# Patient Record
Sex: MALE | Race: WHITE | NOT HISPANIC OR LATINO | Employment: FULL TIME | URBAN - METROPOLITAN AREA
[De-identification: names, ages, dates, MRNs, and addresses within clinical notes are randomized per-mention and may not be internally consistent; named-entity substitution may affect disease eponyms.]

---

## 2017-01-19 ENCOUNTER — OFFICE VISIT (OUTPATIENT)
Dept: OTHER | Facility: CLINIC | Age: 52
End: 2017-01-19
Attending: INTERNAL MEDICINE
Payer: COMMERCIAL

## 2017-01-19 VITALS
SYSTOLIC BLOOD PRESSURE: 132 MMHG | OXYGEN SATURATION: 97 % | WEIGHT: 215 LBS | DIASTOLIC BLOOD PRESSURE: 76 MMHG | BODY MASS INDEX: 34.72 KG/M2 | HEART RATE: 64 BPM

## 2017-01-19 DIAGNOSIS — E78.5 DYSLIPIDEMIA: ICD-10-CM

## 2017-01-19 DIAGNOSIS — F17.218 CIGARETTE NICOTINE DEPENDENCE WITH OTHER NICOTINE-INDUCED DISORDER: ICD-10-CM

## 2017-01-19 DIAGNOSIS — E66.01 MORBID OBESITY DUE TO EXCESS CALORIES (H): ICD-10-CM

## 2017-01-19 DIAGNOSIS — I25.10 CORONARY ARTERY DISEASE INVOLVING NATIVE CORONARY ARTERY OF NATIVE HEART WITHOUT ANGINA PECTORIS: ICD-10-CM

## 2017-01-19 DIAGNOSIS — I10 BENIGN ESSENTIAL HYPERTENSION: ICD-10-CM

## 2017-01-19 DIAGNOSIS — R10.13 EPIGASTRIC PAIN: Primary | ICD-10-CM

## 2017-01-19 PROCEDURE — 99407 BEHAV CHNG SMOKING > 10 MIN: CPT | Mod: ZP | Performed by: INTERNAL MEDICINE

## 2017-01-19 PROCEDURE — 99215 OFFICE O/P EST HI 40 MIN: CPT | Mod: ZP | Performed by: INTERNAL MEDICINE

## 2017-01-19 PROCEDURE — 99211 OFF/OP EST MAY X REQ PHY/QHP: CPT

## 2017-01-19 NOTE — NURSING NOTE
"Chief Complaint   Patient presents with     RECHECK     6 month follow up per        Initial /76 mmHg  Pulse 64  Wt 215 lb (97.523 kg)  SpO2 97% Estimated body mass index is 34.72 kg/(m^2) as calculated from the following:    Height as of 10/31/16: 5' 6\" (1.676 m).    Weight as of this encounter: 215 lb (97.523 kg).  BP completed using cuff size: large right arm.    Face to Face nursing time 7 minutes     Juli Vera CMA      "

## 2017-01-19 NOTE — MR AVS SNAPSHOT
After Visit Summary   1/19/2017    Mason Powers    MRN: [id number]           Patient Information     Date Of Birth          1965        Visit Information        Provider Department      1/19/2017 11:30 AM Niesha Lew MD [facility]      Today's Diagnoses     Epigastric pain    -  1     Coronary artery disease involving native coronary artery of native heart without angina pectoris s/p stent placement in Aug 2015.         Benign essential hypertension         Dyslipidemia         Morbid obesity due to excess calories (H)         Cigarette nicotine dependence with other nicotine-induced disorder           Care Instructions      HOW TO QUIT SMOKING  Smoking is one of the hardest habits to break. About half of all those who have ever smoked have been able to quit, and most of those (about 70%) who still smoke want to quit. Here are some of the best ways to stop smoking.     KEEP TRYING:  It takes most smokers about 8 tries before they are finally able to fully quit. So, the more often you try and fail, the better your chance of quitting the next time! So, don't give up!    GO COLD TURKEY:  Most ex-smokers quit cold turkey. Trying to cut back gradually doesn't seem to work as well, perhaps because it continues the smoking habit. Also, it is possible to fool yourself by inhaling more while smoking fewer cigarettes. This results in the same amount of nicotine in your body!    GET SUPPORT:  Support programs can make an important difference, especially for the heavy smoker. These groups offer lectures, methods to change your behavior and peer support. Call the free national Quitline for more information. 800-QUIT-NOW (540-753-3854). Low-cost or free programs are offered by many hospitals, local chapters of the American Lung Association (586-944-3227) and the American Cancer Society (368-615-7260). Support at home  is important too. Non-smokers can help by offering praise and encouragement. If the smoker fails to quit, encourage them to try again!    OVER-THE-COUNTER MEDICINES:  For those who can't quit on their own, Nicotine Replacement Therapy (NRT) may make quitting much easier. Certain aids such as the nicotine patch, gum and lozenge are available without a prescription. However, it is best to use these under the guidance of your doctor. The skin patch provides a steady supply of nicotine to the body. Nicotine gum and lozenge gives temporary bursts of low levels of nicotine. Both methods take the edge off the craving for cigarettes. WARNING: If you feel symptoms of nicotine overdose, such as nausea, vomiting, dizziness, weakness, or fast heartbeat, stop using these and see your doctor.    PRESCRIPTION MEDICINES:  After evaluating your smoking patterns and prior attempts at quitting, your doctor may offer a prescription medicine such as bupropion (Zyban, Wellbutrin), varenicline (Chantix, Champix), a niocotine inhaler or nasal spray. Each has its unique advantage and side effects which your doctor can review with you.    HEALTH BENEFITS OF QUITTING:  The benefits of quitting start right away and keep improving the longer you go without smokin minutes: blood pressure and pulse return to normal  8 hours: oxygen levels return to normal  2 days: ability to smell and taste begins to improve as damaged nerves start to regrow  2-3 weeks: circulation and lung function improves  1-9 months: decreased cough, congestion and shortness of breath; less tired  1 year: risk of heart attack decreases by half  5 years: risk of lung cancer decreases by half; risk of stroke becomes the same as a non-smoker  For information about how to quit smoking, visit the following links:  National [employer] [place] ,   Clearing the Air, Quit Smoking Today   - an online booklet. http://www.smokefree.gov/pubs/clearing_the_air.pdf  Smokefree.gov  http://smokefree.gov/  QuitNet http://www.quitnet.com/    6748-8851 [employer], [address] [facility], [place] [address]. All rights reserved. This information is not intended as a substitute for professional medical care. Always follow your healthcare professional's instructions.    The Benefits of Living Smoke Free  What do you want to gain from quitting? Check off some reasons to quit.  Health Benefits  ___ Reduce my risk of lung cancer, heart disease, chronic lung disease  ___ Have fewer wrinkles and softer skin  ___ Improve my sense of taste and smell  ___ For pregnant women--reduce the risk of having a miscarriage, stillbirth, premature birth, or low-birth-weight baby  Personal Benefits  ___ Feel more in control of my life  ___ Have better-smelling hair, breath, clothes, home, and car  ___ Save time by not having to take smoke breaks, buy cigarettes, or hunt for a light  ___ Have whiter teeth  Family Benefits  ___ Reduce my children s respiratory tract infections  ___ Set a good example for my children  ___ Reduce my family s cancer risk  Financial Benefits  ___ Save hundreds of dollars each year that would be spent on cigarettes  ___ Save money on medical bills  ___ Save on life, health, and car insurance premiums    Those Dollars Add Up!  Cigarettes are expensive, and getting more expensive all the time. Do you realize how much money you are spending on cigarettes per year? What is the average amount you spend on a pack of cigarettes? What is the average number of packs that you smoke per day? Using your answers to these questions, fill in this formula to help you find out:  ($ _____ per pack) ×  ( _____ number of packs per day) × (365 days) =  $ _____ yearly cost of smoking  Besides tobacco, there are other costs, including extra cleaning bills and replacement costs for clothing and furniture; medical expenses for smoking-related illnesses; and higher health, life, and car insurance  premiums.    Cigars and Pipes Count Too!  Cigars and pipes are also dangerous. So are smokeless (chewing) tobacco and snuff. All of these products contain nicotine, a highly addictive substance that has harmful effects on your body. Quitting smoking means giving up all tobacco products.      3514-4032 [facility], [address] [place] [address]. All rights reserved. This information is not intended as a substitute for professional medical care. Always follow your healthcare professional's instructions.        Follow-ups after your visit        Additional Services     Follow-Up with Vascular Medicine       These are internal orders to be used by  [facility] providers only.                  Follow-up notes from your care team     Return in about 6 months (around 7/19/2017) for Routine Visit.      Future tests that were ordered for you today     Open Future Orders        Priority Expected Expires Ordered    Follow-Up with Vascular Medicine Routine 7/19/2017 1/19/2018 1/19/2017            Who to contact     If you have questions or need follow up information about today's clinic visit or your schedule please contact [facility] directly at [phone].  Normal or non-critical lab and imaging results will be communicated to you by [employer], letter or phone within 4 business days after the clinic has received the results. If you do not hear from us within 7 days, please contact the clinic through [email or web address] or phone. If you have a critical or abnormal lab result, we will notify you by phone as soon as possible.  Submit refill requests through [employer] or call your pharmacy and they will forward the refill request to us. Please allow 3 business days for your refill to be completed.          Additional Information About Your Visit        Owned it Information     [employer] it lets you send messages to your doctor, view your test results, renew your prescriptions, schedule  "appointments and more. To sign up, go to [email or web address] . Click on \"Log in\" on the left side of the screen, which will take you to the Welcome page. Then click on \"Sign up Now\" on the right side of the page.     You will be asked to enter the access code listed below, as well as some personal information. Please follow the directions to create your username and password.     Your access code is: [email or web address]  Expires: 2017 11:58 AM     Your access code will  in 90 days. If you need help or a new code, please call your [facility] clinic or [phone].        Care EveryWhere ID     This is your Care EveryWhere ID. This could be used by other organizations to access your [facility] medical records  [id number]        Your Vitals Were     Pulse Pulse Oximetry                64 97%           Blood Pressure from Last 3 Encounters:   17 132/76   10/31/16 112/80   16 125/90    Weight from Last 3 Encounters:   17 215 lb (97.523 kg)   10/31/16 214 lb (97.07 kg)   16 216 lb (97.977 kg)              We Performed the Following     Follow-Up with Vascular Medicine     SMOKING CESSATION COUNSELING >10 MIN     Tobacco Cessation - for Health Maintenance          Today's Medication Changes          These changes are accurate as of: 17 11:58 AM.  If you have any questions, ask your nurse or doctor.               These medicines have changed or have updated prescriptions.        Dose/Directions    ranitidine 150 MG tablet   Commonly known as:  ZANTAC   This may have changed:    - when to take this  - reasons to take this   Used for:  Epigastric discomfort, Gastroesophageal reflux disease without esophagitis        Dose:  150 mg   Take 1 tablet (150 mg) by mouth 2 times daily   Quantity:  60 tablet   Refills:  2                Primary Care Provider Office Phone # Fax #    Stephanie Hess [phone]       [place] [phone]      "   Thank you!     Thank you for choosing [facility] [place]  for your care. Our goal is always to provide you with excellent care. Hearing back from our patients is one way we can continue to improve our services. Please take a few minutes to complete the written survey that you may receive in the mail after your visit with us. Thank you!             Your Updated Medication List - Protect others around you: Learn how to safely use, store and throw away your medicines at www.disposemymeds.org.          This list is accurate as of: 1/19/17 11:58 AM.  Always use your most recent med list.                   Brand Name Dispense Instructions for use    aspirin 81 MG EC tablet      Take 1 tablet (81 mg) by mouth daily       atorvastatin 80 MG tablet    LIPITOR    90 tablet    Take 1 tablet (80 mg) by mouth daily       metoprolol 25 MG tablet    LOPRESSOR    180 tablet    Take 1 tablet (25 mg) by mouth 2 times daily       nitroglycerin 0.4 MG sublingual tablet    NITROSTAT    25 tablet    Place 1 tablet (0.4 mg) under the tongue every 5 minutes as needed for chest pain       ranitidine 150 MG tablet    ZANTAC    60 tablet    Take 1 tablet (150 mg) by mouth 2 times daily

## 2017-01-19 NOTE — PROGRESS NOTES
HPI: Mason Powers is a 51 year old year old patient who receives primary care from Stephanie Hess   Here today for the follow-up visit.        He was initially admitted to the [facility] with abdominal pain and abnormal proximal SMA with inflammation, extensive workup negative and these symptoms resolved.  CT angiogram did not reveal any dissection.  Optimized  His risk factors and repeat CTA in December 2015 was unremarkable. He was encouraged and educated to quit smoking completely, he quit smoking approximately 12 weeks and smokes 5 cigs a day now..He initially lost 15 pounds then gained back weight..      He also developed anginal symptoms, NSTEMI  , re-admitted , cor angio and RUTH ANN placed. He completed one year duration of plavix and asa , currently ASA only.         PAST MEDICAL HISTORY  Past Medical History   Diagnosis Date     Obesity      Abdominal pain 6/2015     with proximal SMA inflammation vasculatis w/u negative.no discetion     HTN (hypertension)      Nicotine dependence      Dyslipidaemia      Lung nodule      very small 0.4 mm incidental finding on CT.     Uncomplicated asthma      CAD (coronary artery disease) 8/2015     RUTH ANN-OM, mod RCA and mod/sev small PDA, mild-mod diffuse  Lad, Diag small with mod/sev     NSTEMI (non-ST elevated myocardial infarction) (H) 2015     GERD (gastroesophageal reflux disease)        CURRENT MEDICATIONS  Current Outpatient Prescriptions   Medication Sig Dispense Refill     metoprolol (LOPRESSOR) 25 MG tablet Take 1 tablet (25 mg) by mouth 2 times daily 180 tablet 3     atorvastatin (LIPITOR) 80 MG tablet Take 1 tablet (80 mg) by mouth daily 90 tablet 3     ranitidine (ZANTAC) 150 MG tablet Take 1 tablet (150 mg) by mouth 2 times daily (Patient taking differently: Take 150 mg by mouth as needed ) 60 tablet 2     nitroglycerin (NITROSTAT) 0.4 MG SL tablet Place 1 tablet (0.4 mg) under the tongue every 5 minutes as needed for chest pain 25 tablet 3      aspirin EC 81 MG EC tablet Take 1 tablet (81 mg) by mouth daily         PAST SURGICAL HISTORY:  Past Surgical History   Procedure Laterality Date     Stent, coronary, s660 15/18  2015      RUTH ANN-OM2( 50%RCA, 50-70 PDA,50% LAD, diag small >50%, RUTH ANN to OM       ALLERGIES     Allergies   Allergen Reactions     Shrimp Difficulty breathing     Throat starts to swell        FAMILY HISTORY  Family History   Problem Relation Age of Onset     DIABETES Father      DIABETES Brother        VASCULAR FAMILY HISTORY  1st order relative with atherosclerotic PAD: No  1st order relative with AAA: No    SOCIAL HISTORY  Social History     Social History     Marital Status:      Spouse Name: Aurea     Number of Children: 2     Years of Education: N/A     Occupational History                Social History Main Topics     Smoking status: Current Every Day Smoker     Smokeless tobacco: Former User     Quit date: 07/29/2015      Comment: 3-5 cigarettes daily, smoking for the last 30 plus years. quit but restarted 1 pk/week     Alcohol Use: 0.0 oz/week     0 Standard drinks or equivalent per week      Comment: socially     Drug Use: No      Comment: recently quit      Sexual Activity:     Partners: Female     Other Topics Concern     Caffeine Concern Yes     1 pot a coffee occas days      Special Diet Yes     more wheat/grains, less sugar, leaner meats     Exercise Yes     get some walking during the week      Social History Narrative       ROS:   Constitutional: No fever, chills, or sweats. No weight gain/loss   ENT: No visual disturbance, ear ache, epistaxis, sore throat  Allergies/Immunologic: Negative  Respiratory: No cough, hemoptysia  Cardiovascular: As per HPI  GI: No nausea, vomiting, hematemesis, melena, or hematochezia  : No urinary frequency, dysuria, or hematuria  Integument: Negative  Psychiatric: Negative  Neuro: Negative  Endocrinology: Negative   Musculoskeletal: Negative  Vascular: No walking impairment,  claudication, ischemic rest pain or nonhealing wounds    EXAM:  /76 mmHg  Pulse 64  Wt 215 lb (97.523 kg)  SpO2 97%  In general, the patient is a pleasant male in no apparent distress.    HEENT: NC/AT.  PERRLA.  EOMI.  Sclerae white, not injected.  Nares clear.  Pharynx without erythema or exudate.  Dentition intact.    Neck: No adenopathy.  No thyromegaly. Carotids +2/2 bilaterally without bruits.  No jugular venous distension.   Heart: RRR. Normal S1, S2 splits physiologically. No murmur, rub, click, or gallop. The PMI is in the 5th ICS in the midclavicular line. There is no heave.    Lungs: CTA.  No ronchi, wheezes, rales.  No dullness to percussion.   Abdomen: Soft, nontender, nondistended. No organomegaly. No AAA.  No bruits.   Extremities: No clubbing, cyanosis, or edema.  No wounds. No varicose veins signs of chronic venous insufficiency.   Vascular: No bruits are noted.   Brachial Radial Ulnar Femoral Popliteal DP PT   Left 2/2 2/2 2/2 2/2 2/2 2/2 2/2   Right 2/2 2/2 2/2 2/2 2/2 2/2 2/2     Labs:  LIPID RESULTS:  Lab Results   Component Value Date    CHOL 95 10/31/2016    HDL 33* 10/31/2016    LDL 34 10/31/2016    TRIG 140 10/31/2016    CHOLHDLRATIO 2.8 09/18/2015       LIVER ENZYME RESULTS:  Lab Results   Component Value Date    AST 20 06/13/2016    ALT 51 10/31/2016       CBC RESULTS:  Lab Results   Component Value Date    WBC 11.7* 06/13/2016    RBC 4.90 06/13/2016    HGB 15.5 06/13/2016    HCT 45.8 06/13/2016    MCV 94 06/13/2016    MCH 31.6 06/13/2016    MCHC 33.8 06/13/2016    RDW 12.3 06/13/2016     06/13/2016       BMP RESULTS:  Lab Results   Component Value Date     06/13/2016    POTASSIUM 4.6 06/13/2016    CHLORIDE 108 06/13/2016    CO2 27 06/13/2016    ANIONGAP 8 06/13/2016    * 06/13/2016    BUN 13 06/13/2016    CR 0.85 06/13/2016    GFRESTIMATED >90  Non  GFR Calc   06/13/2016    GFRESTBLACK >90   GFR Calc   06/13/2016    DIEGO 8.5  2016          Procedures:    Name: ABIGAIL SHRESTHA  MRN: [id number]  : 1965  Study Date: 2015 10:46 AM  Age: 50 yrs  Gender: Male  Patient Location: [facility]  Reason For Study: Chest Pain  Ordering Physician: VINH EPPERSON  Referring Physician: MASSIEL VAUGHAN  Performed By: Zechariah Worley RDCS    BSA: 2.1 m2  Height: 67 in  Weight: 220 lb  HR: 65  BP: 116/68 mmHg  ______________________________________________________________________________      Procedure  Complete Portable Echo Adult.  ______________________________________________________________________________    Interpretation Summary    The left ventricle is normal in size.  There is moderate concentric left ventricular hypertrophy.  Left ventricular systolic function is normal.  The visual ejection fraction is estimated at 55%.  The mitral valve is normal in structure and function.  Normal tricuspid aortic valve  The aortic root is normal size.  There is no pericardial effusion.  The rhythm was normal sinus.    CONCLUSION: left Heart Cath 8/3/15 at [facility]  1. Successful angioplasty and stenting of a 90% stenosis in the second  obtuse marginal placing a 2.75 x 38 mm Promus premier drug-eluting  stent leaving a 0% residual stenosis with FERMÍN grade 3 flow. Note,  following balloon inflation there was a intimal dissection with  transient slow flow down the vessel successfully treated by a  combination of stenting, adenosine, and nitroglycerin.  2. 50% proximal RCA stenosis with a 50-70% stenosis and a small to  moderate-sized posterior descending artery.  3. 50% mid LAD stenosis involving the takeoff of the second diagonal.  There is also a 40-50% narrowing in the distal left anterior  descending artery.  4. Normal left ventricular function estimated ejection fraction of  60%. Left ventricular end-diastolic pressure of 22 mmHg.    RECOMMENDATIONS:   1. Aspirin 81 mg indefinitely.  2. Plavix 75 mg daily for one year.  3.  Discontinue cigarette smoking.  4. Beta blockers and ACE inhibitors as tolerated.  5. Statin therapy aiming for an LDL of less than 70.  6 Mediterranean-style diet.  7. Daily exercise.    MARLENY TOLEDO MD          CTA ANGIOGRAM ABDOMEN AND PELVIS  12/22/2015 4:47 PM       HISTORY:  Epigastric pain, evaluate superior mesenteric artery.     TECHNIQUE: IV contrast CT angiography is performed through the abdomen  and pelvis utilizing 80 mL of Isovue-370. 3-D reconstructions were  performed at an independent workstation.     COMPARISON 6/17/2015.     FINDINGS: There has been near complete interval resolution of the  previous inflammatory soft tissue changes about the origin and  proximal superior mesenteric artery when compared to the previous  study. Mild residual soft tissue thickening is seen along the inferior  margin of the vessel; however, on high-resolution 3-D images there is  no evidence of inflammatory change or narrowing of the vessel. The  celiac trunk, inferior mesenteric artery, and the right and left renal  arteries appear normal. The abdominal aorta appears normal. There is  mild calcified plaque involving the common iliac arteries bilaterally  without significant stenosis. The iliac, internal iliac, common  femoral, and visualized proximal superficial femoral and profunda  femoris arteries appear normal. The previously demonstrated fatty  infiltration of the liver is not appreciated on today's study. The  gallbladder appears normal with no evidence of vicarious excretion of  contrast as noted on the prior study. Remainder of the abdomen and  pelvis is unremarkable.     IMPRESSION  1. There has been near complete interval resolution of the previous  perivascular inflammatory changes about the origin and proximal  superior mesenteric artery when compared to 6/17/2015. Mild residual  soft tissue thickening along the inferior margin of the vessel without  evidence of inflammatory change or vessel  narrowing.  2. The celiac trunk, the inferior mesenteric artery, the renal  arteries, and the abdominal aorta appear normal.  3. Mild atherosclerotic plaque in the common iliac arteries without  stenosis. Remaining iliac and visualized femoral vessels are normal.  4. Interval resolution of previous fatty infiltration of the liver.     CATIA JOSE MD    CT ABDOMEN WITH CONTRAST 6/3/2016 9:17 PM       HISTORY: Epigastric discomfort, belching, bloating.  Previous history  of SMA inflammation.     COMPARISON: CT abdomen and pelvis 12/22/2015.     TECHNIQUE: Axial images from the lung bases to the iliac crests are  performed with additional coronal reformatted images. 102 mL of Isovue  370 are given intravenously.  Radiation dose for this scan was reduced  using automated exposure control, adjustment of the mA and/or kV  according to patient size, or iterative reconstruction technique. Oral  contrast is also given.     FINDINGS:       The lung bases are clear.     Abdomen: The upper abdominal organs are within normal limits including  the liver, spleen, gallbladder, pancreas, adrenal glands and kidneys.    No hydronephrosis.  Aorta demonstrates a few scattered calcified  plaques.  No evidence of aneurysm or dissection.  Celiac axis and SMA  appear normal.  Imaged portions of bowel are unremarkable.  Appendix  is normal.  Stomach is nondistended.  No evidence of abdominal wall  hernia.  Bone window examination is unremarkable.                                                                       IMPRESSION: No acute changes in the abdomen or lung bases where  imaged.  Celiac axis and SMA appear normal.     CHASTITY BRAVO MD           Assessment and Plan:       H/O Abdominal pain with abnormal proximal SMA with inflammation and no disection ?? Vasculitis per CT but work up negative 6/2015:    He was recently admitted to the [facility] with acute abdominal pain unclear etiology and suspicious for SMA proximal vasculitis but  extensive workup was negative except elevated CRP.  His symptoms completely resolved and he took few day course of Medrol Dosepak for his chronic back pain. He is able to do routine activities without any problems and no recurrence of abdominal pain. Vasculitis panel is negative, rheumatoid factor and janie negative.  His exam is completely benign. Improved hypertension  and currently tolerating statin and aspirin without any problems.  Repeat CTA in 2015 December negative   Recent CT abdomen and pelvis with contrast negative in first week of June 2016    Plan:  Continue aspirin,  Continue statin  Continue ACE I, BB etc   quit smoking        CAD (coronary artery disease) admitted with NSTEMI 8/2/15 s/p RUTH ANN in 2nd OM for 90 % stenosis with good success.    [facility] upper back pain radiating towards bilateral arms.  His troponins were elevated and underwent coronary angiogram and noted severe stenosis of second OM status post drug-eluting stent.  Reviewed recent hospitalization in the [facility].  He is seeing  cardiologist  Dr. Jeff at [facility]   Aspirin for lifetime   completed Plavix , took  one year  Aggressive risk factor modifications with statin, complete cessation of smoking, beta blocker and ACE inhibitor.      Dyslipidaemia:    This taking atorvastatin 80 mg daily, excellent lipids.  Therapeutic lifestyle modification suggested  Weight loss suggested.       Hypertension:    Well controlled with low-dose lisinopril, metoprolol.  DASH Diet suggested  Lose weight and monitor blood pressure outside    Asymptomatic bradycardia while on BB:    Watch for now.      Morbid obesity:    He lost 15 pounds and gained back.   Educated and suggested to see weight loss clinic.  Decrease calorie intake and exercise as tolerated.       Nicotine dependence  Has been smoking since the seventh grade and completely quit for 12  weeks after first Hospitalization.  Restarted and smokes 5 cigs a  day.  Offered help he wanted to quit on his own.  Time spent counseling>10 mins.     Esophageal reflux    Currently on ranitidine and following anti reflux measures etc.  Avoid NSAIDS  Lose weight      Return to clinic in six months or sooner if any problems  CC: Primary care physician    Time spent today 50 minutes, more than 50% of the time spent counseling and coordination of the care and face-to-face contact. REVIEWED ALL THE IMAGING STUDIES WITH THE PATIENT.    This note was dictated by utilizing Dragon software.  CC: Primary care physician

## 2017-01-19 NOTE — PATIENT INSTRUCTIONS

## 2017-03-14 ENCOUNTER — OFFICE VISIT - HEALTHEAST (OUTPATIENT)
Dept: FAMILY MEDICINE | Facility: CLINIC | Age: 52
End: 2017-03-14

## 2017-03-14 DIAGNOSIS — I25.10 ASCVD (ARTERIOSCLEROTIC CARDIOVASCULAR DISEASE): ICD-10-CM

## 2017-03-14 DIAGNOSIS — L21.9 SEBORRHEIC DERMATITIS: ICD-10-CM

## 2017-03-14 DIAGNOSIS — E55.9 VITAMIN D DEFICIENCY: ICD-10-CM

## 2017-03-14 DIAGNOSIS — R73.9 ELEVATED BLOOD SUGAR: ICD-10-CM

## 2017-03-14 LAB
CHOLEST SERPL-MCNC: 129 MG/DL
FASTING STATUS PATIENT QL REPORTED: NO
HBA1C MFR BLD: 5.7 % (ref 3.5–6)
HDLC SERPL-MCNC: 32 MG/DL
LDLC SERPL CALC-MCNC: 66 MG/DL

## 2017-03-14 ASSESSMENT — MIFFLIN-ST. JEOR: SCORE: 1783.4

## 2017-03-23 ENCOUNTER — COMMUNICATION - HEALTHEAST (OUTPATIENT)
Dept: FAMILY MEDICINE | Facility: CLINIC | Age: 52
End: 2017-03-23

## 2017-03-23 ENCOUNTER — AMBULATORY - HEALTHEAST (OUTPATIENT)
Dept: FAMILY MEDICINE | Facility: CLINIC | Age: 52
End: 2017-03-23

## 2017-03-23 DIAGNOSIS — E87.5 HYPERKALEMIA: ICD-10-CM

## 2017-03-23 DIAGNOSIS — R74.8 ELEVATED LIVER ENZYMES: ICD-10-CM

## 2017-03-24 ENCOUNTER — COMMUNICATION - HEALTHEAST (OUTPATIENT)
Dept: FAMILY MEDICINE | Facility: CLINIC | Age: 52
End: 2017-03-24

## 2017-05-20 ENCOUNTER — RECORDS - HEALTHEAST (OUTPATIENT)
Dept: ADMINISTRATIVE | Facility: OTHER | Age: 52
End: 2017-05-20

## 2017-05-25 DIAGNOSIS — I21.4 NON-STEMI (NON-ST ELEVATED MYOCARDIAL INFARCTION) (H): ICD-10-CM

## 2017-05-25 RX ORDER — NITROGLYCERIN 0.4 MG/1
0.4 TABLET SUBLINGUAL EVERY 5 MIN PRN
Qty: 25 TABLET | Refills: 3 | Status: SHIPPED | OUTPATIENT
Start: 2017-05-25 | End: 2018-07-17

## 2017-05-25 NOTE — TELEPHONE ENCOUNTER
Received refill request for:  Ntg  Last OV was: 10/31/2016  Labs/EKG: n/a  F/U scheduled: 10/2017  New script sent to: [facility]

## 2017-08-29 ENCOUNTER — RECORDS - HEALTHEAST (OUTPATIENT)
Dept: ADMINISTRATIVE | Facility: OTHER | Age: 52
End: 2017-08-29

## 2017-08-29 ENCOUNTER — OFFICE VISIT (OUTPATIENT)
Dept: OTHER | Facility: CLINIC | Age: 52
End: 2017-08-29
Attending: INTERNAL MEDICINE
Payer: COMMERCIAL

## 2017-08-29 VITALS
OXYGEN SATURATION: 96 % | HEART RATE: 53 BPM | HEIGHT: 66 IN | DIASTOLIC BLOOD PRESSURE: 85 MMHG | WEIGHT: 221 LBS | BODY MASS INDEX: 35.52 KG/M2 | SYSTOLIC BLOOD PRESSURE: 139 MMHG

## 2017-08-29 DIAGNOSIS — I10 BENIGN ESSENTIAL HYPERTENSION: ICD-10-CM

## 2017-08-29 DIAGNOSIS — F17.218 CIGARETTE NICOTINE DEPENDENCE WITH OTHER NICOTINE-INDUCED DISORDER: ICD-10-CM

## 2017-08-29 DIAGNOSIS — I25.10 CORONARY ARTERY DISEASE INVOLVING NATIVE CORONARY ARTERY OF NATIVE HEART WITHOUT ANGINA PECTORIS: ICD-10-CM

## 2017-08-29 DIAGNOSIS — R10.13 EPIGASTRIC PAIN: Primary | ICD-10-CM

## 2017-08-29 DIAGNOSIS — E66.01 MORBID OBESITY, UNSPECIFIED OBESITY TYPE (H): ICD-10-CM

## 2017-08-29 DIAGNOSIS — E78.2 MIXED HYPERLIPIDEMIA: ICD-10-CM

## 2017-08-29 PROCEDURE — 99214 OFFICE O/P EST MOD 30 MIN: CPT | Mod: ZP | Performed by: INTERNAL MEDICINE

## 2017-08-29 PROCEDURE — 99407 BEHAV CHNG SMOKING > 10 MIN: CPT | Mod: ZP | Performed by: INTERNAL MEDICINE

## 2017-08-29 PROCEDURE — 99211 OFF/OP EST MAY X REQ PHY/QHP: CPT

## 2017-08-29 RX ORDER — METOPROLOL TARTRATE 25 MG/1
25 TABLET, FILM COATED ORAL 2 TIMES DAILY
Qty: 180 TABLET | Refills: 3 | Status: SHIPPED | OUTPATIENT
Start: 2017-08-29 | End: 2018-09-22

## 2017-08-29 RX ORDER — ATORVASTATIN CALCIUM 80 MG/1
80 TABLET, FILM COATED ORAL DAILY
Qty: 90 TABLET | Refills: 3 | Status: SHIPPED | OUTPATIENT
Start: 2017-08-29 | End: 2018-11-27

## 2017-08-29 NOTE — NURSING NOTE
"Chief Complaint   Patient presents with     RECHECK     F/U appt       Initial /85 (BP Location: Right arm, Patient Position: Chair, Cuff Size: Adult Large)  Pulse 53  Ht 5' 6\" (1.676 m)  Wt 221 lb (100.2 kg)  SpO2 96%  BMI 35.67 kg/m2 Estimated body mass index is 35.67 kg/(m^2) as calculated from the following:    Height as of this encounter: 5' 6\" (1.676 m).    Weight as of this encounter: 221 lb (100.2 kg).  Medication Reconciliation: complete    Face to face time: 5 minutes    Magui Fonseca CMA    "

## 2017-08-29 NOTE — PROGRESS NOTES
HPI: Mason Powers is a 51 year old year old patient who receives primary care from Stephanie Hess   Here today for the follow-up visit.      He was initially admitted 2  Years ago to the [facility] with abdominal pain and abnormal proximal SMA with inflammation, extensive workup negative and these symptoms resolved.  CT angiogram did not reveal any dissection.  Optimized  his risk factors and repeat CTA in December 2015 was unremarkable. He was encouraged and educated to quit smoking completely, he quit smoking approximately 12 weeks and smokes 4- 5 cigs a day now.  He lost few pounds since last visit.  He also developed anginal symptoms, NSTEMI  , re-admitted , cor angio and RUTH ANN placed. He completed one year duration of plavix and asa , currently ASA only.     No abdominal pain and no chest pain. He is back to work and feeling better. Few months ago FLP good range.    PAST MEDICAL HISTORY  Past Medical History:   Diagnosis Date     Abdominal pain 6/2015    with proximal SMA inflammation vasculatis w/u negative.no discetion     CAD (coronary artery disease) 8/2015    RUTH ANN-OM, mod RCA and mod/sev small PDA, mild-mod diffuse  Lad, Diag small with mod/sev     Dyslipidaemia      GERD (gastroesophageal reflux disease)      HTN (hypertension)      Lung nodule     very small 0.4 mm incidental finding on CT.     Nicotine dependence      NSTEMI (non-ST elevated myocardial infarction) (H) 2015     Obesity      Uncomplicated asthma        CURRENT MEDICATIONS  Current Outpatient Prescriptions   Medication Sig Dispense Refill     metoprolol (LOPRESSOR) 25 MG tablet Take 1 tablet (25 mg) by mouth 2 times daily 180 tablet 3     atorvastatin (LIPITOR) 80 MG tablet Take 1 tablet (80 mg) by mouth daily 90 tablet 3     nitroglycerin (NITROSTAT) 0.4 MG sublingual tablet Place 1 tablet (0.4 mg) under the tongue every 5 minutes as needed for chest pain 25 tablet 3     ranitidine (ZANTAC) 150 MG tablet Take 1 tablet (150 mg)  by mouth 2 times daily (Patient taking differently: Take 150 mg by mouth as needed ) 60 tablet 2     aspirin EC 81 MG EC tablet Take 1 tablet (81 mg) by mouth daily       [DISCONTINUED] metoprolol (LOPRESSOR) 25 MG tablet Take 1 tablet (25 mg) by mouth 2 times daily 180 tablet 3     [DISCONTINUED] atorvastatin (LIPITOR) 80 MG tablet Take 1 tablet (80 mg) by mouth daily 90 tablet 3       PAST SURGICAL HISTORY:  Past Surgical History:   Procedure Laterality Date     STENT, CORONARY, S660 15/18  2015     RUTH ANN-OM2( 50%RCA, 50-70 PDA,50% LAD, diag small >50%, RUTH ANN to OM       ALLERGIES     Allergies   Allergen Reactions     Shrimp Difficulty breathing     Throat starts to swell        FAMILY HISTORY  Family History   Problem Relation Age of Onset     DIABETES Father      DIABETES Brother        VASCULAR FAMILY HISTORY  1st order relative with atherosclerotic PAD: No  1st order relative with AAA: No    SOCIAL HISTORY  Social History     Social History     Marital status:      Spouse name: Aurea     Number of children: 2     Years of education: N/A     Occupational History                Social History Main Topics     Smoking status: Current Every Day Smoker     Types: Cigarettes     Smokeless tobacco: Former User     Quit date: 7/29/2015      Comment: 3-4 cigarettes daily, smoking for the last 30 plus years. quit but restarted 1 pk/week     Alcohol use 0.0 oz/week     0 Standard drinks or equivalent per week      Comment: socially     Drug use: No      Comment: recently quit      Sexual activity: Yes     Partners: Female     Other Topics Concern     Caffeine Concern Yes     1 pot a coffee occas days      Special Diet Yes     more wheat/grains, less sugar, leaner meats     Exercise Yes     get some walking during the week      Social History Narrative       ROS:   Constitutional: No fever, chills, or sweats. No weight gain/loss   ENT: No visual disturbance, ear ache, epistaxis, sore  "throat  Allergies/Immunologic: Negative  Respiratory: No cough, hemoptysia  Cardiovascular: As per HPI  GI: No nausea, vomiting, hematemesis, melena, or hematochezia  : No urinary frequency, dysuria, or hematuria  Integument: Negative  Psychiatric: Negative  Neuro: Negative  Endocrinology: Negative   Musculoskeletal: Negative  Vascular: No walking impairment, claudication, ischemic rest pain or nonhealing wounds    EXAM:  /85 (BP Location: Right arm, Patient Position: Chair, Cuff Size: Adult Large)  Pulse 53  Ht 5' 6\" (1.676 m)  Wt 221 lb (100.2 kg)  SpO2 96%  BMI 35.67 kg/m2  In general, the patient is a pleasant male in no apparent distress.    HEENT: NC/AT.  PERRLA.  EOMI.  Sclerae white, not injected.  Nares clear.  Pharynx without erythema or exudate.  Dentition intact.    Neck: No adenopathy.  No thyromegaly. Carotids +2/2 bilaterally without bruits.  No jugular venous distension.   Heart: RRR. Normal S1, S2 splits physiologically. No murmur, rub, click, or gallop. The PMI is in the 5th ICS in the midclavicular line. There is no heave.    Lungs: CTA.  No ronchi, wheezes, rales.  No dullness to percussion.   Abdomen: Soft, nontender, nondistended. No organomegaly. No AAA.  No bruits.   Extremities: No clubbing, cyanosis, or edema.  No wounds. No varicose veins signs of chronic venous insufficiency.   Vascular: No bruits are noted.   Brachial Radial Ulnar Femoral Popliteal DP PT   Left 2/2 2/2 2/2 2/2 2/2 2/2 2/2   Right 2/2 2/2 2/2 2/2 2/2 2/2 2/2     Labs:  LIPID RESULTS:  Lab Results   Component Value Date    CHOL 95 10/31/2016    HDL 33 (L) 10/31/2016    LDL 34 10/31/2016    TRIG 140 10/31/2016    CHOLHDLRATIO 2.8 09/18/2015       LIVER ENZYME RESULTS:  Lab Results   Component Value Date    AST 20 06/13/2016    ALT 51 10/31/2016       CBC RESULTS:  Lab Results   Component Value Date    WBC 11.7 (H) 06/13/2016    RBC 4.90 06/13/2016    HGB 15.5 06/13/2016    HCT 45.8 06/13/2016    MCV 94 " 2016    MCH 31.6 2016    MCHC 33.8 2016    RDW 12.3 2016     2016       BMP RESULTS:  Lab Results   Component Value Date     2016    POTASSIUM 4.6 2016    CHLORIDE 108 2016    CO2 27 2016    ANIONGAP 8 2016     (H) 2016    BUN 13 2016    CR 0.85 2016    GFRESTIMATED >90  Non  GFR Calc   2016    GFRESTBLACK >90   GFR Calc   2016    DIEGO 8.5 2016          Procedures:    Name: ABIGAIL SHRESTHA  MRN: [id number]  : 1965  Study Date: 2015 10:46 AM  Age: 50 yrs  Gender: Male  Patient Location: [facility]  Reason For Study: Chest Pain  Ordering Physician: VINH EPPERSON  Referring Physician: MASSIEL VAUGHAN  Performed By: Zechariah Worley RDCS    BSA: 2.1 m2  Height: 67 in  Weight: 220 lb  HR: 65  BP: 116/68 mmHg  ______________________________________________________________________________      Procedure  Complete Portable Echo Adult.  ______________________________________________________________________________    Interpretation Summary    The left ventricle is normal in size.  There is moderate concentric left ventricular hypertrophy.  Left ventricular systolic function is normal.  The visual ejection fraction is estimated at 55%.  The mitral valve is normal in structure and function.  Normal tricuspid aortic valve  The aortic root is normal size.  There is no pericardial effusion.  The rhythm was normal sinus.    CONCLUSION: left Heart Cath 8/3/15 at [facility]  1. Successful angioplasty and stenting of a 90% stenosis in the second  obtuse marginal placing a 2.75 x 38 mm Promus premier drug-eluting  stent leaving a 0% residual stenosis with FERMÍN grade 3 flow. Note,  following balloon inflation there was a intimal dissection with  transient slow flow down the vessel successfully treated by a  combination of stenting, adenosine, and nitroglycerin.  2. 50%  proximal RCA stenosis with a 50-70% stenosis and a small to  moderate-sized posterior descending artery.  3. 50% mid LAD stenosis involving the takeoff of the second diagonal.  There is also a 40-50% narrowing in the distal left anterior  descending artery.  4. Normal left ventricular function estimated ejection fraction of  60%. Left ventricular end-diastolic pressure of 22 mmHg.    RECOMMENDATIONS:   1. Aspirin 81 mg indefinitely.  2. Plavix 75 mg daily for one year.  3. Discontinue cigarette smoking.  4. Beta blockers and ACE inhibitors as tolerated.  5. Statin therapy aiming for an LDL of less than 70.  6 Mediterranean-style diet.  7. Daily exercise.    MARLENY TOLEDO MD          CTA ANGIOGRAM ABDOMEN AND PELVIS  12/22/2015 4:47 PM       HISTORY:  Epigastric pain, evaluate superior mesenteric artery.     TECHNIQUE: IV contrast CT angiography is performed through the abdomen  and pelvis utilizing 80 mL of Isovue-370. 3-D reconstructions were  performed at an independent workstation.     COMPARISON 6/17/2015.     FINDINGS: There has been near complete interval resolution of the  previous inflammatory soft tissue changes about the origin and  proximal superior mesenteric artery when compared to the previous  study. Mild residual soft tissue thickening is seen along the inferior  margin of the vessel; however, on high-resolution 3-D images there is  no evidence of inflammatory change or narrowing of the vessel. The  celiac trunk, inferior mesenteric artery, and the right and left renal  arteries appear normal. The abdominal aorta appears normal. There is  mild calcified plaque involving the common iliac arteries bilaterally  without significant stenosis. The iliac, internal iliac, common  femoral, and visualized proximal superficial femoral and profunda  femoris arteries appear normal. The previously demonstrated fatty  infiltration of the liver is not appreciated on today's study. The  gallbladder appears  normal with no evidence of vicarious excretion of  contrast as noted on the prior study. Remainder of the abdomen and  pelvis is unremarkable.     IMPRESSION  1. There has been near complete interval resolution of the previous  perivascular inflammatory changes about the origin and proximal  superior mesenteric artery when compared to 6/17/2015. Mild residual  soft tissue thickening along the inferior margin of the vessel without  evidence of inflammatory change or vessel narrowing.  2. The celiac trunk, the inferior mesenteric artery, the renal  arteries, and the abdominal aorta appear normal.  3. Mild atherosclerotic plaque in the common iliac arteries without  stenosis. Remaining iliac and visualized femoral vessels are normal.  4. Interval resolution of previous fatty infiltration of the liver.     CATIA JOSE MD    CT ABDOMEN WITH CONTRAST 6/3/2016 9:17 PM       HISTORY: Epigastric discomfort, belching, bloating.  Previous history  of SMA inflammation.     COMPARISON: CT abdomen and pelvis 12/22/2015.     TECHNIQUE: Axial images from the lung bases to the iliac crests are  performed with additional coronal reformatted images. 102 mL of Isovue  370 are given intravenously.  Radiation dose for this scan was reduced  using automated exposure control, adjustment of the mA and/or kV  according to patient size, or iterative reconstruction technique. Oral  contrast is also given.     FINDINGS:       The lung bases are clear.     Abdomen: The upper abdominal organs are within normal limits including  the liver, spleen, gallbladder, pancreas, adrenal glands and kidneys.    No hydronephrosis.  Aorta demonstrates a few scattered calcified  plaques.  No evidence of aneurysm or dissection.  Celiac axis and SMA  appear normal.  Imaged portions of bowel are unremarkable.  Appendix  is normal.  Stomach is nondistended.  No evidence of abdominal wall  hernia.  Bone window examination is  unremarkable.                                                                       IMPRESSION: No acute changes in the abdomen or lung bases where  imaged.  Celiac axis and SMA appear normal.     CHASTITY BRAVO MD           Assessment and Plan:       H/O Abdominal pain with abnormal proximal SMA with inflammation and no disection ?? Vasculitis per CT but work up negative 6/2015:    He was recently admitted to the [facility] with acute abdominal pain unclear etiology and suspicious for SMA proximal vasculitis but extensive workup was negative except elevated CRP.  His symptoms completely resolved and he took few day course of Medrol Dosepak for his chronic back pain. He is able to do routine activities without any problems and no recurrence of abdominal pain. Vasculitis panel is negative, rheumatoid factor and janie negative.  His exam is completely benign. Improved hypertension  and currently tolerating statin and aspirin without any problems.  Repeat CTA in 2015 December negative   Recent CT abdomen and pelvis with contrast negative in first week of June 2016    Plan:  Continue aspirin,  Continue statin  Continue ACE I, BB etc   quit smoking        CAD (coronary artery disease) admitted with NSTEMI 8/2/15 s/p RUTH ANN in 2nd OM for 90 % stenosis with good success.    He was admitted to [facility] in first week of August with upper back pain radiating towards bilateral arms.  His troponins were elevated and underwent coronary angiogram and noted severe stenosis of second OM status post drug-eluting stent.  Reviewed recent hospitalization in the [facility].  He is seeing  cardiologist  Dr. Jeff at [facility]   Aspirin for lifetime   completed Plavix , took  one year  Aggressive risk factor modifications with statin, complete cessation of smoking, beta blocker and ACE inhibitor.      Mixed Hyperlipidemia:    This taking atorvastatin 80 mg daily, excellent lipids.  Therapeutic lifestyle modification suggested  Weight loss  suggested.      Benign essential  Hypertension:    Well controlled with low-dose lisinopril, metoprolol.  DASH Diet suggested  Lose weight and monitor blood pressure outside       Nicotine dependence  Has been smoking since the seventh grade and completely quit for 12  weeks after first Hospitalization.  Restarted and smokes 5 cigs a day.  Offered help he wanted to quit on his own.  Time spent counseling>10 mins.    Morbid obesity:    He lost 15 pounds and gained back.   Educated and suggested to see weight loss clinic.  Decrease calorie intake and exercise as tolerated.    Return to clinic in six months or sooner if any problems  CC: Primary care physician    Time spent today 35 minutes, face to face. More than 50% of the time spent counseling of the above mentioned medical issues.    This note was dictated by utilizing Dragon software.  CC: Primary care physician

## 2017-08-29 NOTE — MR AVS SNAPSHOT
After Visit Summary   8/29/2017    Mason Powers    MRN: [id number]           Patient Information     Date Of Birth          1965        Visit Information        Provider Department      8/29/2017 8:30 AM Niesha Lew MD [facility]      Today's Diagnoses     abdominal pain     -  1    Coronary artery disease involving native coronary artery of native heart without angina pectoris s/p stent placement in Aug 2015.        Mixed hyperlipidemia        Benign essential hypertension        Cigarette nicotine dependence with other nicotine-induced disorder        Morbid obesity, unspecified obesity type (H)          Care Instructions      HOW TO QUIT SMOKING  Smoking is one of the hardest habits to break. About half of all those who have ever smoked have been able to quit, and most of those (about 70%) who still smoke want to quit. Here are some of the best ways to stop smoking.     KEEP TRYING:  It takes most smokers about 8 tries before they are finally able to fully quit. So, the more often you try and fail, the better your chance of quitting the next time! So, don't give up!    GO COLD TURKEY:  Most ex-smokers quit cold turkey. Trying to cut back gradually doesn't seem to work as well, perhaps because it continues the smoking habit. Also, it is possible to fool yourself by inhaling more while smoking fewer cigarettes. This results in the same amount of nicotine in your body!    GET SUPPORT:  Support programs can make an important difference, especially for the heavy smoker. These groups offer lectures, methods to change your behavior and peer support. Call the free national Quitline for more information. 800-QUIT-NOW (273-872-6046). Low-cost or free programs are offered by many hospitals, local chapters of the American Lung Association (080-405-0747) and the American Cancer Society (274-806-0470). Support at  home is important too. Non-smokers can help by offering praise and encouragement. If the smoker fails to quit, encourage them to try again!    OVER-THE-COUNTER MEDICINES:  For those who can't quit on their own, Nicotine Replacement Therapy (NRT) may make quitting much easier. Certain aids such as the nicotine patch, gum and lozenge are available without a prescription. However, it is best to use these under the guidance of your doctor. The skin patch provides a steady supply of nicotine to the body. Nicotine gum and lozenge gives temporary bursts of low levels of nicotine. Both methods take the edge off the craving for cigarettes. WARNING: If you feel symptoms of nicotine overdose, such as nausea, vomiting, dizziness, weakness, or fast heartbeat, stop using these and see your doctor.    PRESCRIPTION MEDICINES:  After evaluating your smoking patterns and prior attempts at quitting, your doctor may offer a prescription medicine such as bupropion (Zyban, Wellbutrin), varenicline (Chantix, Champix), a niocotine inhaler or nasal spray. Each has its unique advantage and side effects which your doctor can review with you.    HEALTH BENEFITS OF QUITTING:  The benefits of quitting start right away and keep improving the longer you go without smokin minutes: blood pressure and pulse return to normal  8 hours: oxygen levels return to normal  2 days: ability to smell and taste begins to improve as damaged nerves start to regrow  2-3 weeks: circulation and lung function improves  1-9 months: decreased cough, congestion and shortness of breath; less tired  1 year: risk of heart attack decreases by half  5 years: risk of lung cancer decreases by half; risk of stroke becomes the same as a non-smoker  For information about how to quit smoking, visit the following links:  National [employer] [place] ,   Clearing the Air, Quit Smoking Today   - an online booklet. http://www.smokefree.gov/pubs/clearing_the_air.pdf  Smokefree.gov  http://smokefree.gov/  QuitNet http://www.quitnet.com/    5841-9766 Alex Zamarripa, [address] [facility], [place] [address]. All rights reserved. This information is not intended as a substitute for professional medical care. Always follow your healthcare professional's instructions.    The Benefits of Living Smoke Free  What do you want to gain from quitting? Check off some reasons to quit.  Health Benefits  ___ Reduce my risk of lung cancer, heart disease, chronic lung disease  ___ Have fewer wrinkles and softer skin  ___ Improve my sense of taste and smell  ___ For pregnant women--reduce the risk of having a miscarriage, stillbirth, premature birth, or low-birth-weight baby  Personal Benefits  ___ Feel more in control of my life  ___ Have better-smelling hair, breath, clothes, home, and car  ___ Save time by not having to take smoke breaks, buy cigarettes, or hunt for a light  ___ Have whiter teeth  Family Benefits  ___ Reduce my children s respiratory tract infections  ___ Set a good example for my children  ___ Reduce my family s cancer risk  Financial Benefits  ___ Save hundreds of dollars each year that would be spent on cigarettes  ___ Save money on medical bills  ___ Save on life, health, and car insurance premiums    Those Dollars Add Up!  Cigarettes are expensive, and getting more expensive all the time. Do you realize how much money you are spending on cigarettes per year? What is the average amount you spend on a pack of cigarettes? What is the average number of packs that you smoke per day? Using your answers to these questions, fill in this formula to help you find out:  ($ _____ per pack) ×  ( _____ number of packs per day) × (365 days) =  $ _____ yearly cost of smoking  Besides tobacco, there are other costs, including extra cleaning bills and replacement costs for clothing and furniture; medical expenses for smoking-related illnesses; and higher health, life, and car insurance  "premiums.    Cigars and Pipes Count Too!  Cigars and pipes are also dangerous. So are smokeless (chewing) tobacco and snuff. All of these products contain nicotine, a highly addictive substance that has harmful effects on your body. Quitting smoking means giving up all tobacco products.      0375-0833 Alex Zamarripa, [address] [place] [address]. All rights reserved. This information is not intended as a substitute for professional medical care. Always follow your healthcare professional's instructions.          Follow-ups after your visit        Follow-up notes from your care team     Return in about 6 months (around 2/28/2018) for Routine Visit.      Who to contact     If you have questions or need follow up information about today's clinic visit or your schedule please contact [facility] directly at [phone].  Normal or non-critical lab and imaging results will be communicated to you by MyChart, letter or phone within 4 business days after the clinic has received the results. If you do not hear from us within 7 days, please contact the clinic through [employer] or phone. If you have a critical or abnormal lab result, we will notify you by phone as soon as possible.  Submit refill requests through [employer] or call your pharmacy and they will forward the refill request to us. Please allow 3 business days for your refill to be completed.          Additional Information About Your Visit        [employer]ark[employer] Media[employer]MyCRM Information     [employer] lets you send messages to your doctor, view your test results, renew your prescriptions, schedule appointments and more. To sign up, go to [email or web address] . Click on \"Log in\" on the left side of the screen, which will take you to the Welcome page. Then click on \"Sign up Now\" on the right side of the page.     You will be asked to enter the access code listed below, as well as some personal information. Please follow the directions to create your " "username and password.     Your access code is: [email or web address]  Expires: 2017  8:55 AM     Your access code will  in 90 days. If you need help or a new code, please call your [facility] or [phone].        Care EveryWhere ID     This is your Care EveryWhere ID. This could be used by other organizations to access your [place] medical records  [id number]        Your Vitals Were     Pulse Height Pulse Oximetry BMI (Body Mass Index)          53 5' 6\" (1.676 m) 96% 35.67 kg/m2         Blood Pressure from Last 3 Encounters:   17 139/85   17 132/76   10/31/16 112/80    Weight from Last 3 Encounters:   17 221 lb (100.2 kg)   17 215 lb (97.5 kg)   10/31/16 214 lb (97.1 kg)              We Performed the Following     Follow-Up with Vascular Medicine     SMOKING CESSATION COUNSELING >10 MIN     Tobacco Cessation - for Health Maintenance          Today's Medication Changes          These changes are accurate as of: 17  8:55 AM.  If you have any questions, ask your nurse or doctor.               These medicines have changed or have updated prescriptions.        Dose/Directions    ranitidine 150 MG tablet   Commonly known as:  ZANTAC   This may have changed:    - when to take this  - reasons to take this   Used for:  Epigastric discomfort, Gastroesophageal reflux disease without esophagitis        Dose:  150 mg   Take 1 tablet (150 mg) by mouth 2 times daily   Quantity:  60 tablet   Refills:  2            Where to get your medicines      These medications were sent to [id number] [address] -  [address] AT [facility]   [address] [address]     Phone:  [phone]     atorvastatin 80 MG tablet    metoprolol 25 MG tablet                Primary Care Provider Office Phone # Fax #    Stephanie Hess [phone]       [address] [address]        Equal Access to Services     [facility] " GAAR : Hadii aad ku kae Rogersali, waaxda luqadaha, qaybta kagomezda mayra, hina mariano yueliam kilgore sandytiara stoner . So [facility] [phone].    ATENCIÓN: Si habla español, tiene a woodruff disposición servicios gratuitos de asistencia lingüística. Llame al [phone].    We comply with applicable federal civil rights laws and [place] laws. We do not discriminate on the basis of race, color, national origin, age, disability sex, sexual orientation or gender identity.            Thank you!     Thank you for choosing [facility] [place]  for your care. Our goal is always to provide you with excellent care. Hearing back from our patients is one way we can continue to improve our services. Please take a few minutes to complete the written survey that you may receive in the mail after your visit with us. Thank you!             Your Updated Medication List - Protect others around you: Learn how to safely use, store and throw away your medicines at www.disposemymeds.org.          This list is accurate as of: 8/29/17  8:55 AM.  Always use your most recent med list.                   Brand Name Dispense Instructions for use Diagnosis    aspirin 81 MG EC tablet      Take 1 tablet (81 mg) by mouth daily    NSTEMI (non-ST elevated myocardial infarction) (H)       atorvastatin 80 MG tablet    LIPITOR    90 tablet    Take 1 tablet (80 mg) by mouth daily    Mixed hyperlipidemia, Coronary artery disease involving native coronary artery of native heart without angina pectoris       metoprolol 25 MG tablet    LOPRESSOR    180 tablet    Take 1 tablet (25 mg) by mouth 2 times daily    Coronary artery disease involving native coronary artery of native heart without angina pectoris       nitroGLYcerin 0.4 MG sublingual tablet    NITROSTAT    25 tablet    Place 1 tablet (0.4 mg) under the tongue every 5 minutes as needed for chest pain    Non-STEMI (non-ST elevated myocardial infarction) (H)       ranitidine 150 MG  tablet    ZANTAC    60 tablet    Take 1 tablet (150 mg) by mouth 2 times daily    Epigastric discomfort, Gastroesophageal reflux disease without esophagitis

## 2017-08-29 NOTE — PATIENT INSTRUCTIONS

## 2017-08-29 NOTE — Clinical Note
Please send my note to outside primary   Patient to follow up with Primary Care provider regarding elevated blood pressure.

## 2018-01-08 ENCOUNTER — RECORDS - HEALTHEAST (OUTPATIENT)
Dept: ADMINISTRATIVE | Facility: OTHER | Age: 53
End: 2018-01-08

## 2018-01-08 ENCOUNTER — OFFICE VISIT (OUTPATIENT)
Dept: CARDIOLOGY | Facility: CLINIC | Age: 53
End: 2018-01-08
Attending: INTERNAL MEDICINE
Payer: COMMERCIAL

## 2018-01-08 VITALS
DIASTOLIC BLOOD PRESSURE: 80 MMHG | HEIGHT: 66 IN | BODY MASS INDEX: 36.43 KG/M2 | HEART RATE: 59 BPM | SYSTOLIC BLOOD PRESSURE: 130 MMHG | WEIGHT: 226.7 LBS

## 2018-01-08 DIAGNOSIS — I25.10 CORONARY ARTERY DISEASE INVOLVING NATIVE CORONARY ARTERY OF NATIVE HEART WITHOUT ANGINA PECTORIS: ICD-10-CM

## 2018-01-08 LAB
ALT SERPL W P-5'-P-CCNC: 47 U/L (ref 0–70)
CHOLEST SERPL-MCNC: 125 MG/DL
HDLC SERPL-MCNC: 39 MG/DL
LDLC SERPL CALC-MCNC: 35 MG/DL
NONHDLC SERPL-MCNC: 86 MG/DL
TRIGL SERPL-MCNC: 256 MG/DL

## 2018-01-08 PROCEDURE — 36415 COLL VENOUS BLD VENIPUNCTURE: CPT | Performed by: INTERNAL MEDICINE

## 2018-01-08 PROCEDURE — 80061 LIPID PANEL: CPT | Performed by: INTERNAL MEDICINE

## 2018-01-08 PROCEDURE — 99214 OFFICE O/P EST MOD 30 MIN: CPT | Performed by: INTERNAL MEDICINE

## 2018-01-08 PROCEDURE — 84460 ALANINE AMINO (ALT) (SGPT): CPT | Performed by: INTERNAL MEDICINE

## 2018-01-08 NOTE — MR AVS SNAPSHOT
After Visit Summary   1/8/2018    Mason Powers    MRN: [id number]           Patient Information     Date Of Birth          1965        Visit Information        Provider Department      1/8/2018 9:30 AM Jonel Jeff MD [facility]        Today's Diagnoses     Coronary artery disease involving native coronary artery of native heart without angina pectoris           Follow-ups after your visit        Additional Services     Follow-Up with Cardiologist                 Future tests that were ordered for you today     Open Future Orders        Priority Expected Expires Ordered    Basic metabolic panel Routine 1/8/2019 1/9/2019 1/8/2018    Lipid Profile Routine 1/8/2019 1/8/2019 1/8/2018    ALT Routine 1/8/2019 1/8/2019 1/8/2018    NM Exercise stress test (nuc card) Routine 1/8/2019 1/9/2019 1/8/2018    Follow-Up with Cardiologist Routine 1/8/2019 1/9/2019 1/8/2018            Who to contact     If you have questions or need follow up information about today's clinic visit or your schedule please contact [facility] directly at [phone].  Normal or non-critical lab and imaging results will be communicated to you by MyChart, letter or phone within 4 business days after the clinic has received the results. If you do not hear from us within 7 days, please contact the clinic through MyChart or phone. If you have a critical or abnormal lab result, we will notify you by phone as soon as possible.  Submit refill requests through [employer] or call your pharmacy and they will forward the refill request to us. Please allow 3 business days for your refill to be completed.          Additional Information About Your Visit        MyChart Information     [employer] lets you send messages to your doctor, view your test results, renew your prescriptions, schedule appointments and more. To sign up, go to  [email or web address] . Click on \"Log in\" on the left side of the screen, which will take you to the Welcome page. Then click on \"Sign up Now\" on the right side of the page.     You will be asked to enter the access code listed below, as well as some personal information. Please follow the directions to create your username and password.     Your access code is: [email or web address]  Expires: 2018 10:10 AM     Your access code will  in 90 days. If you need help or a new code, please call your [place] clinic or [phone].        Care EveryWhere ID     This is your Care EveryWhere ID. This could be used by other organizations to access your [place] medical records  [id number]        Your Vitals Were     Pulse Height BMI (Body Mass Index)             59 1.676 m (5' 6\") 36.59 kg/m2          Blood Pressure from Last 3 Encounters:   18 130/80   17 139/85   17 132/76    Weight from Last 3 Encounters:   18 102.8 kg (226 lb 11.2 oz)   17 100.2 kg (221 lb)   17 97.5 kg (215 lb)              We Performed the Following     Follow-Up with Cardiologist        Primary Care Provider Office Phone # Fax #    Stephanie Hess [phone]       [place] [address]        Equal Access to Services     [facility]: Hadii aad ku hadasho Soomaali, waaxda luqadaha, qaybta kaalmada adeegyada, hina stoner . So [facility] [phone].    ATENCIÓN: Si habla deniseañol, tiene a woodruff disposición servicios gratuitos de asistencia lingüística. Llame al [phone].    We comply with applicable federal civil rights laws and [place] laws. We do not discriminate on the basis of race, color, national origin, age, disability, sex, sexual orientation, or gender identity.            Thank you!     Thank you for choosing [facility]  for your care. Our goal is always to provide you with " excellent care. Hearing back from our patients is one way we can continue to improve our services. Please take a few minutes to complete the written survey that you may receive in the mail after your visit with us. Thank you!             Your Updated Medication List - Protect others around you: Learn how to safely use, store and throw away your medicines at www.disposemymeds.org.          This list is accurate as of: 1/8/18 10:10 AM.  Always use your most recent med list.                   Brand Name Dispense Instructions for use Diagnosis    aspirin 81 MG EC tablet      Take 1 tablet (81 mg) by mouth daily    NSTEMI (non-ST elevated myocardial infarction) (H)       atorvastatin 80 MG tablet    LIPITOR    90 tablet    Take 1 tablet (80 mg) by mouth daily    Mixed hyperlipidemia, Coronary artery disease involving native coronary artery of native heart without angina pectoris, Epigastric pain, Benign essential hypertension, Cigarette nicotine dependence with other nicotine-induced disorder, Morbid obesity, unspecified obesity type (H)       metoprolol 25 MG tablet    LOPRESSOR    180 tablet    Take 1 tablet (25 mg) by mouth 2 times daily    Coronary artery disease involving native coronary artery of native heart without angina pectoris, Epigastric pain, Mixed hyperlipidemia, Benign essential hypertension, Cigarette nicotine dependence with other nicotine-induced disorder, Morbid obesity, unspecified obesity type (H)       nitroGLYcerin 0.4 MG sublingual tablet    NITROSTAT    25 tablet    Place 1 tablet (0.4 mg) under the tongue every 5 minutes as needed for chest pain    Non-STEMI (non-ST elevated myocardial infarction) (H)

## 2018-01-08 NOTE — LETTER
1/8/2018      Stephanie Hess MD  [address] [address]      RE: Mason Powers       Dear Colleague,    I had the pleasure of seeing Mason Powers in the [facility].    HISTORY OF PRESENT ILLNESS:  I had the pleasure of following up on our mutual patient, Mason Powers.  He is a most pleasant 52-year-old gentleman.  We met him back in 2015 when he presented with a myocardial infarction.  At that time he had an obtuse marginal vessel which we stented.  There was moderate diffuse disease in the RCA with a small PDA that had moderate to severe disease but may be too small to intervene on.  He had only mild to moderate disease in the remainder of the left coronary arteries.  A nuclear stress test in 2016 was negative for ischemia and showed normal ejection fraction.  The patient was seen by the vascular service for abdominal pain, and question was raised if this was vasculitis.  No clear vasculitis diagnosis was made, and he has not had any problems since that time.  He reports feeling well.  According to our scale, he is up 10 pounds from a year ago.  Of course, this is just after the holidays now and he admits that he was not eating as well.  The reason I bring this up is because I saw was triglyceride numbers before I asked him.  His triglycerides are now well over 200.  He runs a low HDL, but his current HDL is 39 which is his best ever, and his LDL remains in the 30s, which is more than 70 points lower than when we met him.  He is on maximum-dose Lipitor.  At this juncture, I pushed again about diet and exercise.  We talked in detail about continuous daily exercise at a modest level for a longer period of time.  We also talked about some mixed-in isometric exercise.  We talked about the metabolic syndrome in detail, and his blood sugar was elevated once before.  I will get another blood sugar next time.  I will ask  Dr. Hess if she thinks it might be prudent to get another lipid panel [facility] through the year when we are well away from the holidays.  We briefly talked about metformin, but there is no indication at this point for that.  We also talked about the fact that he is still smoking, albeit a small amount, it is still a risk factor.  I will see him back in 1 year, at which time I will do electrolytes, lipids and will do a stress test, since that will be 3 years from the last time we looked.  Today's visit was 25 minutes, greater than 50% counseling.       Outpatient Encounter Prescriptions as of 1/8/2018   Medication Sig Dispense Refill     metoprolol (LOPRESSOR) 25 MG tablet Take 1 tablet (25 mg) by mouth 2 times daily 180 tablet 3     atorvastatin (LIPITOR) 80 MG tablet Take 1 tablet (80 mg) by mouth daily 90 tablet 3     nitroglycerin (NITROSTAT) 0.4 MG sublingual tablet Place 1 tablet (0.4 mg) under the tongue every 5 minutes as needed for chest pain 25 tablet 3     aspirin EC 81 MG EC tablet Take 1 tablet (81 mg) by mouth daily       [DISCONTINUED] ranitidine (ZANTAC) 150 MG tablet Take 1 tablet (150 mg) by mouth 2 times daily (Patient taking differently: Take 150 mg by mouth as needed ) 60 tablet 2     No facility-administered encounter medications on file as of 1/8/2018.      Again, thank you for allowing me to participate in the care of your patient.      Sincerely,    Jonel Jeff MD     [facility]    cc:   Niesha Lew MD    [facility] [facility] [address], [address] [place] [address]

## 2018-01-08 NOTE — PROGRESS NOTES
HPI and Plan:   See dictation    Orders Placed This Encounter   Procedures     NM Exercise stress test (nuc card)     Basic metabolic panel     Lipid Profile     ALT     Follow-Up with Cardiologist     No orders of the defined types were placed in this encounter.    Medications Discontinued During This Encounter   Medication Reason     ranitidine (ZANTAC) 150 MG tablet Stopped by Patient         Encounter Diagnosis   Name Primary?     Coronary artery disease involving native coronary artery of native heart without angina pectoris        CURRENT MEDICATIONS:  Current Outpatient Prescriptions   Medication Sig Dispense Refill     metoprolol (LOPRESSOR) 25 MG tablet Take 1 tablet (25 mg) by mouth 2 times daily 180 tablet 3     atorvastatin (LIPITOR) 80 MG tablet Take 1 tablet (80 mg) by mouth daily 90 tablet 3     nitroglycerin (NITROSTAT) 0.4 MG sublingual tablet Place 1 tablet (0.4 mg) under the tongue every 5 minutes as needed for chest pain 25 tablet 3     aspirin EC 81 MG EC tablet Take 1 tablet (81 mg) by mouth daily         ALLERGIES     Allergies   Allergen Reactions     Shrimp Difficulty breathing     Throat starts to swell        PAST MEDICAL HISTORY:  Past Medical History:   Diagnosis Date     Abdominal pain 6/2015    with proximal SMA inflammation vasculatis w/u negative.no discetion     CAD (coronary artery disease) 8/2015    RUTH ANN-OM, mod RCA and mod/sev small PDA, mild-mod diffuse  Lad, Diag small with mod/sev     Dyslipidaemia      GERD (gastroesophageal reflux disease)      HTN (hypertension)      Lung nodule     very small 0.4 mm incidental finding on CT.     Morbid obesity (H) 7/7/2015     Nicotine dependence      NSTEMI (non-ST elevated myocardial infarction) (H) 2015     Uncomplicated asthma        PAST SURGICAL HISTORY:  Past Surgical History:   Procedure Laterality Date     STENT, CORONARY, S660 15/18  2015     RUTH ANN-OM2( 50%RCA, 50-70 PDA,50% LAD, diag small >50%, RUTH ANN to OM       FAMILY  "HISTORY:  Family History   Problem Relation Age of Onset     DIABETES Father      DIABETES Brother        SOCIAL HISTORY:  Social History     Social History     Marital status:      Spouse name: Aurea     Number of children: 2     Years of education: N/A     Occupational History                Social History Main Topics     Smoking status: Current Every Day Smoker     Types: Cigarettes     Smokeless tobacco: Former User     Quit date: 7/29/2015      Comment: 3-4 cigarettes daily, smoking for the last 30 plus years. quit but restarted 1 pk/week     Alcohol use 0.0 oz/week     0 Standard drinks or equivalent per week      Comment: socially     Drug use: No      Comment: recently quit      Sexual activity: Yes     Partners: Female     Other Topics Concern     Caffeine Concern Yes     1 pot a coffee occas days      Special Diet Yes     more wheat/grains, less sugar, leaner meats     Exercise Yes     get some walking during the week      Social History Narrative       Review of Systems:  Skin:  Negative     Eyes:  Positive for glasses  ENT:  Negative    Respiratory:  Negative    Cardiovascular:  Negative    Gastroenterology: Negative    Genitourinary:  Negative    Musculoskeletal:  Negative    Neurologic:  Positive for numbness or tingling of hands;numbness or tingling of feet  Psychiatric:  Negative    Heme/Lymph/Imm:  Positive for easy bruising  Endocrine:  Negative      Physical Exam:  Vitals: /80  Pulse 59  Ht 1.676 m (5' 6\")  Wt 102.8 kg (226 lb 11.2 oz)  BMI 36.59 kg/m2    Constitutional:  cooperative, alert and oriented, well developed, well nourished, in no acute distress        Skin:  warm and dry to the touch, no apparent skin lesions or masses noted          Head:  normocephalic, no masses or lesions        Eyes:  pupils equal and round, conjunctivae and lids unremarkable, sclera white, no xanthalasma, EOMS intact, no nystagmus        Lymph:No Cervical lymphadenopathy " present     ENT:  no pallor or cyanosis, dentition good        Neck:  carotid pulses are full and equal bilaterally, JVP normal, no carotid bruit        Respiratory:            Cardiac: regular rhythm, normal S1/S2, no S3 or S4, apical impulse not displaced, no murmurs, gallops or rubs                pulses full and equal, no bruits auscultated                                        GI:  abdomen soft, non-tender, BS normoactive, no mass, no HSM, no bruits obese      Extremities and Muscular Skeletal:  no deformities, clubbing, cyanosis, erythema observed              Neurological:  no gross motor deficits        Psych:  Alert and Oriented x 3      Recent Lab Results:  LIPID RESULTS:  Lab Results   Component Value Date    CHOL 125 01/08/2018    HDL 39 (L) 01/08/2018    LDL 35 01/08/2018    TRIG 256 (H) 01/08/2018    CHOLHDLRATIO 2.8 09/18/2015       LIVER ENZYME RESULTS:  Lab Results   Component Value Date    AST 20 06/13/2016    ALT 47 01/08/2018       CBC RESULTS:  Lab Results   Component Value Date    WBC 11.7 (H) 06/13/2016    RBC 4.90 06/13/2016    HGB 15.5 06/13/2016    HCT 45.8 06/13/2016    MCV 94 06/13/2016    MCH 31.6 06/13/2016    MCHC 33.8 06/13/2016    RDW 12.3 06/13/2016     06/13/2016       BMP RESULTS:  Lab Results   Component Value Date     06/13/2016    POTASSIUM 4.6 06/13/2016    CHLORIDE 108 06/13/2016    CO2 27 06/13/2016    ANIONGAP 8 06/13/2016     (H) 06/13/2016    BUN 13 06/13/2016    CR 0.85 06/13/2016    GFRESTIMATED >90  Non  GFR Calc   06/13/2016    GFRESTBLACK >90   GFR Calc   06/13/2016    DIEGO 8.5 06/13/2016        A1C RESULTS:  No results found for: A1C    INR RESULTS:  Lab Results   Component Value Date    INR 1.02 10/20/2015    INR 0.95 06/16/2015           CC  Jonel Jeff MD  [id number]  [place] [address]

## 2018-01-08 NOTE — PROGRESS NOTES
HISTORY OF PRESENT ILLNESS:  I had the pleasure of following up on our mutual patient, Mason Powers.  He is a most pleasant 52-year-old gentleman.  We met him back in 2015 when he presented with a myocardial infarction.  At that time he had an obtuse marginal vessel which we stented.  There was moderate diffuse disease in the RCA with a small PDA that had moderate to severe disease but may be too small to intervene on.  He had only mild to moderate disease in the remainder of the left coronary arteries.  A nuclear stress test in 2016 was negative for ischemia and showed normal ejection fraction.  The patient was seen by the vascular service for abdominal pain, and question was raised if this was vasculitis.  No clear vasculitis diagnosis was made, and he has not had any problems since that time.  He reports feeling well.  According to our scale, he is up 10 pounds from a year ago.  Of course, this is just after the holidays now and he admits that he was not eating as well.  The reason I bring this up is because I saw was triglyceride numbers before I asked him.  His triglycerides are now well over 200.  He runs a low HDL, but his current HDL is 39 which is his best ever, and his LDL remains in the 30s, which is more than 70 points lower than when we met him.  He is on maximum-dose Lipitor.  At this juncture, I pushed again about diet and exercise.  We talked in detail about continuous daily exercise at a modest level for a longer period of time.  We also talked about some mixed-in isometric exercise.  We talked about the metabolic syndrome in detail, and his blood sugar was elevated once before.  I will get another blood sugar next time.  I will ask Dr. Hess if she thinks it might be prudent to get another lipid panel correction through the year when we are well away from the holidays.  We briefly talked about metformin, but there is no indication at this point for that.  We also talked about the fact that he  is still smoking, albeit a small amount, it is still a risk factor.  I will see him back in 1 year, at which time I will do electrolytes, lipids and will do a stress test, since that will be 3 years from the last time we looked.  Today's visit was 25 minutes, greater than 50% counseling.      cc:   Stephanie Hess MD   [address]  [address]       Niesha Lew MD    [facility] [address], [address]  [place] [address]  [id number]         ZOË ALBERT MD             D: 2018 10:09   T: 2018 10:57   MT: LEONARD      Name:     ABIGAIL SHRESTHA   MRN:      [id number]        Account:      [id number]   :      1965           Service Date: 2018      Document: [id number]

## 2018-03-28 ENCOUNTER — OFFICE VISIT (OUTPATIENT)
Dept: OTHER | Facility: CLINIC | Age: 53
End: 2018-03-28
Attending: INTERNAL MEDICINE
Payer: COMMERCIAL

## 2018-03-28 VITALS
WEIGHT: 216 LBS | SYSTOLIC BLOOD PRESSURE: 120 MMHG | DIASTOLIC BLOOD PRESSURE: 73 MMHG | BODY MASS INDEX: 34.86 KG/M2 | HEART RATE: 52 BPM

## 2018-03-28 DIAGNOSIS — E66.01 MORBID OBESITY, UNSPECIFIED OBESITY TYPE (H): ICD-10-CM

## 2018-03-28 DIAGNOSIS — F17.218 CIGARETTE NICOTINE DEPENDENCE WITH OTHER NICOTINE-INDUCED DISORDER: ICD-10-CM

## 2018-03-28 DIAGNOSIS — I25.10 CORONARY ARTERY DISEASE INVOLVING NATIVE CORONARY ARTERY OF NATIVE HEART WITHOUT ANGINA PECTORIS: Primary | ICD-10-CM

## 2018-03-28 DIAGNOSIS — I10 BENIGN ESSENTIAL HYPERTENSION: ICD-10-CM

## 2018-03-28 DIAGNOSIS — E78.2 MIXED HYPERLIPIDEMIA: ICD-10-CM

## 2018-03-28 PROCEDURE — G0463 HOSPITAL OUTPT CLINIC VISIT: HCPCS

## 2018-03-28 PROCEDURE — 99214 OFFICE O/P EST MOD 30 MIN: CPT | Mod: ZP | Performed by: INTERNAL MEDICINE

## 2018-03-28 PROCEDURE — 99406 BEHAV CHNG SMOKING 3-10 MIN: CPT | Mod: ZP | Performed by: INTERNAL MEDICINE

## 2018-03-28 NOTE — MR AVS SNAPSHOT
After Visit Summary   3/28/2018    Mason Powers    MRN: [id number]           Patient Information     Date Of Birth          1965        Visit Information        Provider Department      3/28/2018 10:30 AM Niesha Lew MD [facility]      Today's Diagnoses     Coronary artery disease involving native coronary artery of native heart without angina pectoris s/p stent placement in Aug 2015.    -  1    Cigarette nicotine dependence with other nicotine-induced disorder        Mixed hyperlipidemia        Benign essential hypertension        Morbid obesity, unspecified obesity type (H)          Care Instructions    Quit smoking    Fasting labs before next visit in 6 months either here or at primary clinic.          Follow-ups after your visit        Follow-up notes from your care team     Return in about 6 months (around 9/28/2018).      Future tests that were ordered for you today     Open Future Orders        Priority Expected Expires Ordered    CBC with platelets differential Routine 3/28/2018 3/28/2019 3/28/2018    Comprehensive metabolic panel Routine 3/28/2018 3/28/2019 3/28/2018    Lipid panel reflex to direct LDL Fasting Routine 3/28/2018 3/28/2019 3/28/2018    TSH with free T4 reflex Routine 3/28/2018 3/28/2019 3/28/2018            Who to contact     If you have questions or need follow up information about today's clinic visit or your schedule please contact [facility] directly at [phone].  Normal or non-critical lab and imaging results will be communicated to you by MyChart, letter or phone within 4 business days after the clinic has received the results. If you do not hear from us within 7 days, please contact the clinic through MyChart or phone. If you have a critical or abnormal lab result, we will notify you by phone as soon as possible.  Submit refill requests through  "MyChart or call your pharmacy and they will forward the refill request to us. Please allow 3 business days for your refill to be completed.          Additional Information About Your Visit        MyChart Information     [employer] lets you send messages to your doctor, view your test results, renew your prescriptions, schedule appointments and more. To sign up, go to [email or web address] . Click on \"Log in\" on the left side of the screen, which will take you to the Welcome page. Then click on \"Sign up Now\" on the right side of the page.     You will be asked to enter the access code listed below, as well as some personal information. Please follow the directions to create your username and password.     Your access code is: [email or web address]  Expires: 2018 11:10 AM     Your access code will  in 90 days. If you need help or a new code, please call your [facility] clinic or [phone].        Care EveryWhere ID     This is your Care EveryWhere ID. This could be used by other organizations to access your [facility] medical records  [id number]        Your Vitals Were     Pulse BMI (Body Mass Index)                52 34.86 kg/m2           Blood Pressure from Last 3 Encounters:   18 120/73   18 130/80   17 139/85    Weight from Last 3 Encounters:   18 216 lb (98 kg)   18 226 lb 11.2 oz (102.8 kg)   17 221 lb (100.2 kg)              We Performed the Following     Follow-Up with Vascular Medicine     SMOKING CESSATION COUNSELING 3-10 MIN        Primary Care Provider Office Phone # Fax #    Stephanie Hess [phone]       [place] [address]        Equal Access to Services     GENARO LOGAN : Tony Irwin, chris luo, hina kenney. So [facility] [phone].    ATENCIÓN: Si habla español, tiene a woodruff disposición servicios gratuitos de asistencia " lingüística. Kristina al [phone].    We comply with applicable federal civil rights laws and [place] laws. We do not discriminate on the basis of race, color, national origin, age, disability, sex, sexual orientation, or gender identity.            Thank you!     Thank you for choosing [facility] [place]  for your care. Our goal is always to provide you with excellent care. Hearing back from our patients is one way we can continue to improve our services. Please take a few minutes to complete the written survey that you may receive in the mail after your visit with us. Thank you!             Your Updated Medication List - Protect others around you: Learn how to safely use, store and throw away your medicines at www.disposemymeds.org.          This list is accurate as of 3/28/18 10:50 AM.  Always use your most recent med list.                   Brand Name Dispense Instructions for use Diagnosis    aspirin 81 MG EC tablet      Take 1 tablet (81 mg) by mouth daily    NSTEMI (non-ST elevated myocardial infarction) (H)       atorvastatin 80 MG tablet    LIPITOR    90 tablet    Take 1 tablet (80 mg) by mouth daily    Mixed hyperlipidemia, Coronary artery disease involving native coronary artery of native heart without angina pectoris, Epigastric pain, Benign essential hypertension, Cigarette nicotine dependence with other nicotine-induced disorder, Morbid obesity, unspecified obesity type (H)       metoprolol tartrate 25 MG tablet    LOPRESSOR    180 tablet    Take 1 tablet (25 mg) by mouth 2 times daily    Coronary artery disease involving native coronary artery of native heart without angina pectoris, Epigastric pain, Mixed hyperlipidemia, Benign essential hypertension, Cigarette nicotine dependence with other nicotine-induced disorder, Morbid obesity, unspecified obesity type (H)       nitroGLYcerin 0.4 MG sublingual tablet    NITROSTAT    25 tablet    Place 1 tablet (0.4 mg) under the tongue every  5 minutes as needed for chest pain    Non-STEMI (non-ST elevated myocardial infarction) (H)

## 2018-03-28 NOTE — NURSING NOTE
"Chief Complaint   Patient presents with     RECHECK     follow up       Initial /73 (BP Location: Right arm, Patient Position: Chair, Cuff Size: Adult Large)  Pulse 52  Wt 216 lb (98 kg)  BMI 34.86 kg/m2 Estimated body mass index is 34.86 kg/(m^2) as calculated from the following:    Height as of 1/8/18: 5' 6\" (1.676 m).    Weight as of this encounter: 216 lb (98 kg).  Medication Reconciliation: complete     Cindy Garcia MA    "

## 2018-03-28 NOTE — PROGRESS NOTES
CC: Follow up visit, lost 10 lbs, still smokes 3 cigs a day      HPI: Mason Powers is a 52 year old  patient who receives primary care from Stephanie Hess   Here today for the follow-up visit.    He was initially admitted 2 plus Years ago to the [facility] with abdominal pain and abnormal proximal SMA with inflammation, extensive workup negative and these symptoms resolved.  CT angiogram did not reveal any dissection.  Optimized  his risk factors and repeat CTA in December 2015 was unremarkable. He was encouraged and educated to quit smoking completely, he quit smoking approximately 12 weeks and smokes 4- 5 cigs a day now.  He lost 10 pounds since last visit.  He also developed anginal symptoms, NSTEMI  , re-admitted , cor angio and RUTH ANN placed in 8/2015. He completed one year duration of plavix and asa , currently ASA only.     No abdominal pain and no chest pain. He is back to work and feeling better. Few months ago FLP good range.    PAST MEDICAL HISTORY  Past Medical History:   Diagnosis Date     Abdominal pain 6/2015    with proximal SMA inflammation vasculatis w/u negative.no discetion     CAD (coronary artery disease) 8/2015    RUTH ANN-OM, mod RCA and mod/sev small PDA, mild-mod diffuse  Lad, Diag small with mod/sev     Dyslipidaemia      GERD (gastroesophageal reflux disease)      HTN (hypertension)      Lung nodule     very small 0.4 mm incidental finding on CT.     Morbid obesity (H) 7/7/2015     Nicotine dependence      NSTEMI (non-ST elevated myocardial infarction) (H) 2015     Uncomplicated asthma        CURRENT MEDICATIONS  Current Outpatient Prescriptions   Medication Sig Dispense Refill     metoprolol (LOPRESSOR) 25 MG tablet Take 1 tablet (25 mg) by mouth 2 times daily 180 tablet 3     atorvastatin (LIPITOR) 80 MG tablet Take 1 tablet (80 mg) by mouth daily 90 tablet 3     nitroglycerin (NITROSTAT) 0.4 MG sublingual tablet Place 1 tablet (0.4 mg) under the tongue every 5 minutes as needed  for chest pain 25 tablet 3     aspirin EC 81 MG EC tablet Take 1 tablet (81 mg) by mouth daily         PAST SURGICAL HISTORY:  Past Surgical History:   Procedure Laterality Date     STENT, CORONARY, S660 15/18  2015     RUTH ANN-OM2( 50%RCA, 50-70 PDA,50% LAD, diag small >50%, RUTH ANN to OM       ALLERGIES     Allergies   Allergen Reactions     Shrimp Difficulty breathing     Throat starts to swell        FAMILY HISTORY  Family History   Problem Relation Age of Onset     DIABETES Father      DIABETES Brother        VASCULAR FAMILY HISTORY  1st order relative with atherosclerotic PAD: No  1st order relative with AAA: No    SOCIAL HISTORY  Social History     Social History     Marital status:      Spouse name: Aurea     Number of children: 2     Years of education: N/A     Occupational History                Social History Main Topics     Smoking status: Current Every Day Smoker     Types: Cigarettes     Smokeless tobacco: Former User     Quit date: 7/29/2015      Comment: 3-4 cigarettes daily, smoking for the last 30 plus years. quit but restarted 1 pk/week     Alcohol use 0.0 oz/week     0 Standard drinks or equivalent per week      Comment: socially     Drug use: No      Comment: recently quit      Sexual activity: Yes     Partners: Female     Other Topics Concern     Caffeine Concern Yes     1 pot a coffee occas days      Special Diet Yes     more wheat/grains, less sugar, leaner meats     Exercise Yes     get some walking during the week      Social History Narrative       ROS:   Constitutional: No fever, chills, or sweats. No weight gain/loss   ENT: No visual disturbance, ear ache, epistaxis, sore throat  Allergies/Immunologic: Negative  Respiratory: No cough, hemoptysia  Cardiovascular: As per HPI  GI: No nausea, vomiting, hematemesis, melena, or hematochezia  : No urinary frequency, dysuria, or hematuria  Integument: Negative  Psychiatric: Negative  Neuro: Negative  Endocrinology:  Negative   Musculoskeletal: Negative  Vascular: No walking impairment, claudication, ischemic rest pain or nonhealing wounds    EXAM:  /73 (BP Location: Right arm, Patient Position: Chair, Cuff Size: Adult Large)  Pulse 52  Wt 216 lb (98 kg)  BMI 34.86 kg/m2  In general, the patient is a pleasant male in no apparent distress.    HEENT: NC/AT.  PERRLA.  EOMI.  Sclerae white, not injected.  Nares clear.  Pharynx without erythema or exudate.  Dentition intact.    Neck: No adenopathy.  No thyromegaly. Carotids +2/2 bilaterally without bruits.  No jugular venous distension.   Heart: RRR. Normal S1, S2 splits physiologically. No murmur, rub, click, or gallop. The PMI is in the 5th ICS in the midclavicular line. There is no heave.    Lungs: CTA.  No ronchi, wheezes, rales.  No dullness to percussion.   Abdomen: Soft, nontender, nondistended. No organomegaly. No AAA.  No bruits.   Extremities: No clubbing, cyanosis, or edema.  No wounds. No varicose veins signs of chronic venous insufficiency.   Vascular: No bruits are noted.   Brachial Radial Ulnar Femoral Popliteal DP PT   Left 2/2 2/2 2/2 2/2 2/2 2/2 2/2   Right 2/2 2/2 2/2 2/2 2/2 2/2 2/2     Labs:  LIPID RESULTS:  Lab Results   Component Value Date    CHOL 125 01/08/2018    HDL 39 (L) 01/08/2018    LDL 35 01/08/2018    TRIG 256 (H) 01/08/2018    CHOLHDLRATIO 2.8 09/18/2015       LIVER ENZYME RESULTS:  Lab Results   Component Value Date    AST 20 06/13/2016    ALT 47 01/08/2018       CBC RESULTS:  Lab Results   Component Value Date    WBC 11.7 (H) 06/13/2016    RBC 4.90 06/13/2016    HGB 15.5 06/13/2016    HCT 45.8 06/13/2016    MCV 94 06/13/2016    MCH 31.6 06/13/2016    MCHC 33.8 06/13/2016    RDW 12.3 06/13/2016     06/13/2016       BMP RESULTS:  Lab Results   Component Value Date     06/13/2016    POTASSIUM 4.6 06/13/2016    CHLORIDE 108 06/13/2016    CO2 27 06/13/2016    ANIONGAP 8 06/13/2016     (H) 06/13/2016    BUN 13 06/13/2016     CR 0.85 2016    GFRESTIMATED >90  Non  GFR Calc   2016    GFRESTBLACK >90   GFR Calc   2016    DIEGO 8.5 2016          Procedures:    Name: ABIGAIL SHRESTHA  MRN: [id number]  : 1965  Study Date: 2015 10:46 AM  Age: 50 yrs  Gender: Male  Patient Location: [facility]  Reason For Study: Chest Pain  Ordering Physician: VINH EPPERSON  Referring Physician: MASSIEL VAUGHAN  Performed By: Zechariah Worley RDCS    BSA: 2.1 m2  Height: 67 in  Weight: 220 lb  HR: 65  BP: 116/68 mmHg  ______________________________________________________________________________      Procedure  Complete Portable Echo Adult.  ______________________________________________________________________________    Interpretation Summary    The left ventricle is normal in size.  There is moderate concentric left ventricular hypertrophy.  Left ventricular systolic function is normal.  The visual ejection fraction is estimated at 55%.  The mitral valve is normal in structure and function.  Normal tricuspid aortic valve  The aortic root is normal size.  There is no pericardial effusion.  The rhythm was normal sinus.    CONCLUSION: left Heart Cath 8/3/15 at [facility]  1. Successful angioplasty and stenting of a 90% stenosis in the second  obtuse marginal placing a 2.75 x 38 mm Promus premier drug-eluting  stent leaving a 0% residual stenosis with FERMÍN grade 3 flow. Note,  following balloon inflation there was a intimal dissection with  transient slow flow down the vessel successfully treated by a  combination of stenting, adenosine, and nitroglycerin.  2. 50% proximal RCA stenosis with a 50-70% stenosis and a small to  moderate-sized posterior descending artery.  3. 50% mid LAD stenosis involving the takeoff of the second diagonal.  There is also a 40-50% narrowing in the distal left anterior  descending artery.  4. Normal left ventricular function estimated ejection fraction of  60%.  Left ventricular end-diastolic pressure of 22 mmHg.    RECOMMENDATIONS:   1. Aspirin 81 mg indefinitely.  2. Plavix 75 mg daily for one year.  3. Discontinue cigarette smoking.  4. Beta blockers and ACE inhibitors as tolerated.  5. Statin therapy aiming for an LDL of less than 70.  6 Mediterranean-style diet.  7. Daily exercise.    MARLENY TOLEDO MD          CTA ANGIOGRAM ABDOMEN AND PELVIS  12/22/2015 4:47 PM       HISTORY:  Epigastric pain, evaluate superior mesenteric artery.     TECHNIQUE: IV contrast CT angiography is performed through the abdomen  and pelvis utilizing 80 mL of Isovue-370. 3-D reconstructions were  performed at an independent workstation.     COMPARISON 6/17/2015.     FINDINGS: There has been near complete interval resolution of the  previous inflammatory soft tissue changes about the origin and  proximal superior mesenteric artery when compared to the previous  study. Mild residual soft tissue thickening is seen along the inferior  margin of the vessel; however, on high-resolution 3-D images there is  no evidence of inflammatory change or narrowing of the vessel. The  celiac trunk, inferior mesenteric artery, and the right and left renal  arteries appear normal. The abdominal aorta appears normal. There is  mild calcified plaque involving the common iliac arteries bilaterally  without significant stenosis. The iliac, internal iliac, common  femoral, and visualized proximal superficial femoral and profunda  femoris arteries appear normal. The previously demonstrated fatty  infiltration of the liver is not appreciated on today's study. The  gallbladder appears normal with no evidence of vicarious excretion of  contrast as noted on the prior study. Remainder of the abdomen and  pelvis is unremarkable.     IMPRESSION  1. There has been near complete interval resolution of the previous  perivascular inflammatory changes about the origin and proximal  superior mesenteric artery when compared to  6/17/2015. Mild residual  soft tissue thickening along the inferior margin of the vessel without  evidence of inflammatory change or vessel narrowing.  2. The celiac trunk, the inferior mesenteric artery, the renal  arteries, and the abdominal aorta appear normal.  3. Mild atherosclerotic plaque in the common iliac arteries without  stenosis. Remaining iliac and visualized femoral vessels are normal.  4. Interval resolution of previous fatty infiltration of the liver.     CATIA JOSE MD    CT ABDOMEN WITH CONTRAST 6/3/2016 9:17 PM       HISTORY: Epigastric discomfort, belching, bloating.  Previous history  of SMA inflammation.     COMPARISON: CT abdomen and pelvis 12/22/2015.     TECHNIQUE: Axial images from the lung bases to the iliac crests are  performed with additional coronal reformatted images. 102 mL of Isovue  370 are given intravenously.  Radiation dose for this scan was reduced  using automated exposure control, adjustment of the mA and/or kV  according to patient size, or iterative reconstruction technique. Oral  contrast is also given.     FINDINGS:       The lung bases are clear.     Abdomen: The upper abdominal organs are within normal limits including  the liver, spleen, gallbladder, pancreas, adrenal glands and kidneys.    No hydronephrosis.  Aorta demonstrates a few scattered calcified  plaques.  No evidence of aneurysm or dissection.  Celiac axis and SMA  appear normal.  Imaged portions of bowel are unremarkable.  Appendix  is normal.  Stomach is nondistended.  No evidence of abdominal wall  hernia.  Bone window examination is unremarkable.                                                                       IMPRESSION: No acute changes in the abdomen or lung bases where  imaged.  Celiac axis and SMA appear normal.     CHASTITY BRAVO MD           Assessment and Plan:       H/O Abdominal pain with abnormal proximal SMA with inflammation and no disection ?? Vasculitis per CT but work up negative  6/2015:    He was initially  admitted to the  Hospital with acute abdominal pain unclear etiology and suspicious for SMA proximal vasculitis but extensive workup was negative except elevated CRP.  His symptoms completely resolved and he took few day course of Medrol Dosepak for his chronic back pain. He is able to do routine activities without any problems and no recurrence of abdominal pain. Vasculitis panel is negative, rheumatoid factor and janie negative.  His exam is completely benign. Improved hypertension  and currently tolerating statin and aspirin without any problems.  CTA in 2015 December negative   Recent CT abdomen and pelvis with contrast negative in first week of June 2016  He is back to work as a   Restarted smoking 3 cigs a day.  Plan:  No need for CT surveillance, monitor clinically   Continue aspirin,  Continue statin  Continue ACE I, BB etc   quit smoking        CAD (coronary artery disease) admitted with NSTEMI 8/2/15 s/p RUTH ANN in 2nd OM for 90 % stenosis with good success.    He was admitted to [facility] in first week of August 2015  with upper back pain radiating towards bilateral arms.  His troponins were elevated and underwent coronary angiogram and noted severe stenosis of second OM status post drug-eluting stent.  Reviewed recent hospitalization in the [facility].  He is seeing  cardiologist  Dr. Jeff at [facility]   Aspirin for lifetime   completed Plavix , took  one year  Aggressive risk factor modifications with statin, complete cessation of smoking, beta blocker and ACE inhibitor.      Mixed Hyperlipidemia:    This taking atorvastatin 80 mg daily, excellent lipids.  Therapeutic lifestyle modification suggested  Weight loss suggested.      Benign essential  Hypertension:    Well controlled with low-dose lisinopril, metoprolol.  DASH Diet suggested  Lose weight and monitor blood pressure outside       Nicotine dependence  Has been smoking since the seventh grade and completely quit for 12   weeks after first Hospitalization.  Restarted and smokes 3 cigs a day.  Offered help he wanted to quit on his own.  Time spent counseling> 6 mins.    Morbid obesity:    He lost 10 pounds in the last 3 months, congratulated.  Educated and suggested continue same plan  Decrease calorie intake and exercise as tolerated.    Return to clinic in six months or sooner if any problems  CC: Primary care physician    Time spent today 35 minutes, face to face. More than 50% of the time spent counseling of the above mentioned medical issues.    This note was dictated by utilizing Dragon software.  CC: Primary care physician

## 2018-07-17 DIAGNOSIS — E78.2 MIXED HYPERLIPIDEMIA: ICD-10-CM

## 2018-07-17 DIAGNOSIS — I25.10 CORONARY ARTERY DISEASE INVOLVING NATIVE CORONARY ARTERY OF NATIVE HEART WITHOUT ANGINA PECTORIS: ICD-10-CM

## 2018-07-17 DIAGNOSIS — I21.4 NON-STEMI (NON-ST ELEVATED MYOCARDIAL INFARCTION) (H): ICD-10-CM

## 2018-07-17 LAB
ALBUMIN SERPL-MCNC: 3.6 G/DL (ref 3.4–5)
ALP SERPL-CCNC: 109 U/L (ref 40–150)
ALT SERPL W P-5'-P-CCNC: 54 U/L (ref 0–70)
ANION GAP SERPL CALCULATED.3IONS-SCNC: 6 MMOL/L (ref 3–14)
AST SERPL W P-5'-P-CCNC: 23 U/L (ref 0–45)
BASOPHILS # BLD AUTO: 0.1 10E9/L (ref 0–0.2)
BASOPHILS NFR BLD AUTO: 0.5 %
BILIRUB SERPL-MCNC: 0.6 MG/DL (ref 0.2–1.3)
BUN SERPL-MCNC: 15 MG/DL (ref 7–30)
CALCIUM SERPL-MCNC: 8.5 MG/DL (ref 8.5–10.1)
CHLORIDE SERPL-SCNC: 107 MMOL/L (ref 94–109)
CHOLEST SERPL-MCNC: 80 MG/DL
CO2 SERPL-SCNC: 26 MMOL/L (ref 20–32)
CREAT SERPL-MCNC: 0.86 MG/DL (ref 0.66–1.25)
DIFFERENTIAL METHOD BLD: ABNORMAL
EOSINOPHIL # BLD AUTO: 0.6 10E9/L (ref 0–0.7)
EOSINOPHIL NFR BLD AUTO: 4.7 %
ERYTHROCYTE [DISTWIDTH] IN BLOOD BY AUTOMATED COUNT: 12.7 % (ref 10–15)
GFR SERPL CREATININE-BSD FRML MDRD: >90 ML/MIN/1.7M2
GLUCOSE SERPL-MCNC: 116 MG/DL (ref 70–99)
HCT VFR BLD AUTO: 49.7 % (ref 40–53)
HDLC SERPL-MCNC: 30 MG/DL
HGB BLD-MCNC: 16.7 G/DL (ref 13.3–17.7)
LDLC SERPL CALC-MCNC: 26 MG/DL
LYMPHOCYTES # BLD AUTO: 2.9 10E9/L (ref 0.8–5.3)
LYMPHOCYTES NFR BLD AUTO: 21.3 %
MCH RBC QN AUTO: 32.2 PG (ref 26.5–33)
MCHC RBC AUTO-ENTMCNC: 33.6 G/DL (ref 31.5–36.5)
MCV RBC AUTO: 96 FL (ref 78–100)
MONOCYTES # BLD AUTO: 0.9 10E9/L (ref 0–1.3)
MONOCYTES NFR BLD AUTO: 6.4 %
NEUTROPHILS # BLD AUTO: 9 10E9/L (ref 1.6–8.3)
NEUTROPHILS NFR BLD AUTO: 67.1 %
NONHDLC SERPL-MCNC: 50 MG/DL
PLATELET # BLD AUTO: 250 10E9/L (ref 150–450)
POTASSIUM SERPL-SCNC: 4.6 MMOL/L (ref 3.4–5.3)
PROT SERPL-MCNC: 6.6 G/DL (ref 6.8–8.8)
RBC # BLD AUTO: 5.19 10E12/L (ref 4.4–5.9)
SODIUM SERPL-SCNC: 139 MMOL/L (ref 133–144)
TRIGL SERPL-MCNC: 119 MG/DL
TSH SERPL DL<=0.005 MIU/L-ACNC: 0.87 MU/L (ref 0.4–4)
WBC # BLD AUTO: 13.4 10E9/L (ref 4–11)

## 2018-07-17 PROCEDURE — 85025 COMPLETE CBC W/AUTO DIFF WBC: CPT | Performed by: INTERNAL MEDICINE

## 2018-07-17 PROCEDURE — 80053 COMPREHEN METABOLIC PANEL: CPT | Performed by: INTERNAL MEDICINE

## 2018-07-17 PROCEDURE — 36415 COLL VENOUS BLD VENIPUNCTURE: CPT | Performed by: INTERNAL MEDICINE

## 2018-07-17 PROCEDURE — 84443 ASSAY THYROID STIM HORMONE: CPT | Performed by: INTERNAL MEDICINE

## 2018-07-17 PROCEDURE — 80061 LIPID PANEL: CPT | Performed by: INTERNAL MEDICINE

## 2018-07-17 RX ORDER — NITROGLYCERIN 0.4 MG/1
0.4 TABLET SUBLINGUAL EVERY 5 MIN PRN
Qty: 25 TABLET | Refills: 3 | Status: SHIPPED | OUTPATIENT
Start: 2018-07-17 | End: 2019-07-30

## 2018-08-07 ENCOUNTER — OFFICE VISIT (OUTPATIENT)
Dept: OTHER | Facility: CLINIC | Age: 53
End: 2018-08-07
Attending: INTERNAL MEDICINE
Payer: COMMERCIAL

## 2018-08-07 VITALS
OXYGEN SATURATION: 96 % | HEART RATE: 46 BPM | BODY MASS INDEX: 33.96 KG/M2 | WEIGHT: 210.4 LBS | DIASTOLIC BLOOD PRESSURE: 75 MMHG | SYSTOLIC BLOOD PRESSURE: 113 MMHG

## 2018-08-07 DIAGNOSIS — I25.10 CORONARY ARTERY DISEASE INVOLVING NATIVE CORONARY ARTERY OF NATIVE HEART WITHOUT ANGINA PECTORIS: Primary | ICD-10-CM

## 2018-08-07 DIAGNOSIS — I10 BENIGN ESSENTIAL HYPERTENSION: ICD-10-CM

## 2018-08-07 DIAGNOSIS — E66.01 MORBID OBESITY, UNSPECIFIED OBESITY TYPE (H): ICD-10-CM

## 2018-08-07 DIAGNOSIS — F17.218 CIGARETTE NICOTINE DEPENDENCE WITH OTHER NICOTINE-INDUCED DISORDER: ICD-10-CM

## 2018-08-07 DIAGNOSIS — E78.2 MIXED HYPERLIPIDEMIA: ICD-10-CM

## 2018-08-07 DIAGNOSIS — R91.1 LUNG NODULE: ICD-10-CM

## 2018-08-07 PROCEDURE — G0463 HOSPITAL OUTPT CLINIC VISIT: HCPCS

## 2018-08-07 PROCEDURE — 99407 BEHAV CHNG SMOKING > 10 MIN: CPT | Mod: ZP | Performed by: INTERNAL MEDICINE

## 2018-08-07 PROCEDURE — 99214 OFFICE O/P EST MOD 30 MIN: CPT | Mod: ZP | Performed by: INTERNAL MEDICINE

## 2018-08-07 NOTE — PROGRESS NOTES
CC: Follow up visit, lost 6 lbs since last visit, still smokes 2 cigs a day      HPI: Mason Powers is a 53 year old  patient who receives primary care from Stephanie Hess   Here today for the follow-up visit.    He was initially admitted 2 plus Years ago to the [facility] with abdominal pain and abnormal proximal SMA with inflammation, extensive workup negative and these symptoms resolved.  CT angiogram did not reveal any dissection.  Optimized  his risk factors and repeat CTA in December 2015 was unremarkable. He was encouraged and educated to quit smoking completely, he quit smoking approximately 12 weeks and smokes 2-3 cigs a day now.  He lost 6 pounds since last visit.  He also developed anginal symptoms, NSTEMI  , re-admitted , cor angio and [facility] ANN placed in 8/2015. He completed one year duration of plavix and asa , currently ASA only.     No abdominal pain and no chest pain. He is back to work and feeling better. F3 weeks ago FLP done excellent range TC 80, TRigs 119, HDL 30, LDL 26.  CMP good range  TFTs normal  Hx of small lung nodule , never went for repeat CT chest ? Asymptomatic.    PAST MEDICAL HISTORY  Past Medical History:   Diagnosis Date     Abdominal pain 6/2015    with proximal SMA inflammation vasculatis w/u negative.no discetion     CAD (coronary artery disease) 8/2015    RUTH ANN-OM, mod RCA and mod/sev small PDA, mild-mod diffuse  Lad, Diag small with mod/sev     Dyslipidaemia      GERD (gastroesophageal reflux disease)      HTN (hypertension)      Lung nodule     very small 0.4 mm incidental finding on CT.     Morbid obesity (H) 7/7/2015     Nicotine dependence      NSTEMI (non-ST elevated myocardial infarction) (H) 2015     Uncomplicated asthma        CURRENT MEDICATIONS  Current Outpatient Prescriptions   Medication Sig Dispense Refill     aspirin EC 81 MG EC tablet Take 1 tablet (81 mg) by mouth daily       atorvastatin (LIPITOR) 80 MG tablet Take 1 tablet (80 mg) by mouth daily 90  tablet 3     metoprolol (LOPRESSOR) 25 MG tablet Take 1 tablet (25 mg) by mouth 2 times daily 180 tablet 3     nitroGLYcerin (NITROSTAT) 0.4 MG sublingual tablet Place 1 tablet (0.4 mg) under the tongue every 5 minutes as needed for chest pain 25 tablet 3       PAST SURGICAL HISTORY:  Past Surgical History:   Procedure Laterality Date     STENT, CORONARY, S660 15/18  2015     RUTH ANN-OM2( 50%RCA, 50-70 PDA,50% LAD, diag small >50%, RUTH ANN to OM       ALLERGIES     Allergies   Allergen Reactions     Shrimp Difficulty breathing     Throat starts to swell        FAMILY HISTORY  Family History   Problem Relation Age of Onset     Diabetes Father      Diabetes Brother        VASCULAR FAMILY HISTORY  1st order relative with atherosclerotic PAD: No  1st order relative with AAA: No    SOCIAL HISTORY  Social History     Social History     Marital status:      Spouse name: Aurea     Number of children: 2     Years of education: N/A     Occupational History                Social History Main Topics     Smoking status: Current Every Day Smoker     Types: Cigarettes     Smokeless tobacco: Former User     Quit date: 7/29/2015      Comment: 3-4 cigarettes daily, smoking for the last 30 plus years. quit but restarted 1 pk/week     Alcohol use 0.0 oz/week     0 Standard drinks or equivalent per week      Comment: socially     Drug use: No      Comment: recently quit      Sexual activity: Yes     Partners: Female     Other Topics Concern     Caffeine Concern Yes     1 pot a coffee occas days      Special Diet Yes     more wheat/grains, less sugar, leaner meats     Exercise Yes     get some walking during the week      Social History Narrative       ROS:   Constitutional: No fever, chills, or sweats. No weight gain/loss   ENT: No visual disturbance, ear ache, epistaxis, sore throat  Allergies/Immunologic: Negative  Respiratory: No cough, hemoptysia  Cardiovascular: As per HPI  GI: No nausea, vomiting, hematemesis,  melena, or hematochezia  : No urinary frequency, dysuria, or hematuria  Integument: Negative  Psychiatric: Negative  Neuro: Negative  Endocrinology: Negative   Musculoskeletal: Negative  Vascular: No walking impairment, claudication, ischemic rest pain or nonhealing wounds    EXAM:  /75 (BP Location: Right arm, Patient Position: Chair, Cuff Size: Adult Large)  Pulse (!) 46  Wt 210 lb 6.4 oz (95.4 kg)  SpO2 96%  BMI 33.96 kg/m2  In general, the patient is a pleasant male in no apparent distress.    HEENT: NC/AT.  PERRLA.  EOMI.  Sclerae white, not injected.  Nares clear.  Pharynx without erythema or exudate.  Dentition intact.    Neck: No adenopathy.  No thyromegaly. Carotids +2/2 bilaterally without bruits.  No jugular venous distension.   Heart: RRR. Normal S1, S2 splits physiologically. No murmur, rub, click, or gallop. The PMI is in the 5th ICS in the midclavicular line. There is no heave.    Lungs: CTA.  No ronchi, wheezes, rales.  No dullness to percussion.   Abdomen: Soft, nontender, nondistended. No organomegaly. No AAA.  No bruits.   Extremities: No clubbing, cyanosis, or edema.  No wounds. No varicose veins signs of chronic venous insufficiency.   Vascular: No bruits are noted.   Brachial Radial Ulnar Femoral Popliteal DP PT   Left 2/2 2/2 2/2 2/2 2/2 2/2 2/2   Right 2/2 2/2 2/2 2/2 2/2 2/2 2/2     Labs:  LIPID RESULTS:  Lab Results   Component Value Date    CHOL 80 07/17/2018    HDL 30 (L) 07/17/2018    LDL 26 07/17/2018    TRIG 119 07/17/2018    CHOLHDLRATIO 2.8 09/18/2015       LIVER ENZYME RESULTS:  Lab Results   Component Value Date    AST 23 07/17/2018    ALT 54 07/17/2018       CBC RESULTS:  Lab Results   Component Value Date    WBC 13.4 (H) 07/17/2018    RBC 5.19 07/17/2018    HGB 16.7 07/17/2018    HCT 49.7 07/17/2018    MCV 96 07/17/2018    MCH 32.2 07/17/2018    MCHC 33.6 07/17/2018    RDW 12.7 07/17/2018     07/17/2018       BMP RESULTS:  Lab Results   Component Value Date      2018    POTASSIUM 4.6 2018    CHLORIDE 107 2018    CO2 26 2018    ANIONGAP 6 2018     (H) 2018    BUN 15 2018    CR 0.86 2018    GFRESTIMATED >90 2018    GFRESTBLACK >90 2018    DIEGO 8.5 2018          Procedures:    Name: ABIGAIL SHRESTHA  MRN: [id number]  : 1965  Study Date: 2015 10:46 AM  Age: 50 yrs  Gender: Male  Patient Location: [facility]  Reason For Study: Chest Pain  Ordering Physician: VINH EPPERSON  Referring Physician: MASSIEL VAUGHAN  Performed By: Zechariah Worley RDCS    BSA: 2.1 m2  Height: 67 in  Weight: 220 lb  HR: 65  BP: 116/68 mmHg  ______________________________________________________________________________      Procedure  Complete Portable Echo Adult.  ______________________________________________________________________________    Interpretation Summary    The left ventricle is normal in size.  There is moderate concentric left ventricular hypertrophy.  Left ventricular systolic function is normal.  The visual ejection fraction is estimated at 55%.  The mitral valve is normal in structure and function.  Normal tricuspid aortic valve  The aortic root is normal size.  There is no pericardial effusion.  The rhythm was normal sinus.    CONCLUSION: left Heart Cath 8/3/15 at [facility]. Successful angioplasty and stenting of a 90% stenosis in the second  obtuse marginal placing a 2.75 x 38 mm Promus premier drug-eluting  stent leaving a 0% residual stenosis with FERMÍN grade 3 flow. Note,  following balloon inflation there was a intimal dissection with  transient slow flow down the vessel successfully treated by a  combination of stenting, adenosine, and nitroglycerin.  2. 50% proximal RCA stenosis with a 50-70% stenosis and a small to  moderate-sized posterior descending artery.  3. 50% mid LAD stenosis involving the takeoff of the second diagonal.  There is also a 40-50% narrowing in the distal  left anterior  descending artery.  4. Normal left ventricular function estimated ejection fraction of  60%. Left ventricular end-diastolic pressure of 22 mmHg.    RECOMMENDATIONS:   1. Aspirin 81 mg indefinitely.  2. Plavix 75 mg daily for one year.  3. Discontinue cigarette smoking.  4. Beta blockers and ACE inhibitors as tolerated.  5. Statin therapy aiming for an LDL of less than 70.  6 Mediterranean-style diet.  7. Daily exercise.    MARLENY TOLEDO MD          CTA ANGIOGRAM ABDOMEN AND PELVIS  12/22/2015 4:47 PM       HISTORY:  Epigastric pain, evaluate superior mesenteric artery.     TECHNIQUE: IV contrast CT angiography is performed through the abdomen  and pelvis utilizing 80 mL of Isovue-370. 3-D reconstructions were  performed at an independent workstation.     COMPARISON 6/17/2015.     FINDINGS: There has been near complete interval resolution of the  previous inflammatory soft tissue changes about the origin and  proximal superior mesenteric artery when compared to the previous  study. Mild residual soft tissue thickening is seen along the inferior  margin of the vessel; however, on high-resolution 3-D images there is  no evidence of inflammatory change or narrowing of the vessel. The  celiac trunk, inferior mesenteric artery, and the right and left renal  arteries appear normal. The abdominal aorta appears normal. There is  mild calcified plaque involving the common iliac arteries bilaterally  without significant stenosis. The iliac, internal iliac, common  femoral, and visualized proximal superficial femoral and profunda  femoris arteries appear normal. The previously demonstrated fatty  infiltration of the liver is not appreciated on today's study. The  gallbladder appears normal with no evidence of vicarious excretion of  contrast as noted on the prior study. Remainder of the abdomen and  pelvis is unremarkable.     IMPRESSION  1. There has been near complete interval resolution of the  previous  perivascular inflammatory changes about the origin and proximal  superior mesenteric artery when compared to 6/17/2015. Mild residual  soft tissue thickening along the inferior margin of the vessel without  evidence of inflammatory change or vessel narrowing.  2. The celiac trunk, the inferior mesenteric artery, the renal  arteries, and the abdominal aorta appear normal.  3. Mild atherosclerotic plaque in the common iliac arteries without  stenosis. Remaining iliac and visualized femoral vessels are normal.  4. Interval resolution of previous fatty infiltration of the liver.     CATIA JOSE MD    CT ABDOMEN WITH CONTRAST 6/3/2016 9:17 PM       HISTORY: Epigastric discomfort, belching, bloating.  Previous history  of SMA inflammation.     COMPARISON: CT abdomen and pelvis 12/22/2015.     TECHNIQUE: Axial images from the lung bases to the iliac crests are  performed with additional coronal reformatted images. 102 mL of Isovue  370 are given intravenously.  Radiation dose for this scan was reduced  using automated exposure control, adjustment of the mA and/or kV  according to patient size, or iterative reconstruction technique. Oral  contrast is also given.     FINDINGS:       The lung bases are clear.     Abdomen: The upper abdominal organs are within normal limits including  the liver, spleen, gallbladder, pancreas, adrenal glands and kidneys.    No hydronephrosis.  Aorta demonstrates a few scattered calcified  plaques.  No evidence of aneurysm or dissection.  Celiac axis and SMA  appear normal.  Imaged portions of bowel are unremarkable.  Appendix  is normal.  Stomach is nondistended.  No evidence of abdominal wall  hernia.  Bone window examination is unremarkable.                                                                       IMPRESSION: No acute changes in the abdomen or lung bases where  imaged.  Celiac axis and SMA appear normal.     CHASTITY BRAVO MD           Assessment and Plan:       H/O  Abdominal pain with abnormal proximal SMA with inflammation and no disection ?? Vasculitis per CT but work up negative 6/2015:    He was initially  admitted to the  Hospital with acute abdominal pain unclear etiology and suspicious for SMA proximal vasculitis but extensive workup was negative except elevated CRP.  His symptoms completely resolved and he took few day course of Medrol Dosepak for his chronic back pain. He is able to do routine activities without any problems and no recurrence of abdominal pain. Vasculitis panel is negative, rheumatoid factor and janie negative.  His exam is completely benign. Improved hypertension  and currently tolerating statin and aspirin without any problems.  CTA in 2015 December negative   Recent CT abdomen and pelvis with contrast negative in first week of June 2016  He is back to work as a   Restarted smoking 2 cigs a day.  Plan:  No need for CT surveillance, monitor clinically   Continue aspirin,  Continue statin  Continue ACE I, BB etc   quit smoking        CAD (coronary artery disease) admitted with NSTEMI 8/2/15 s/p RUTH ANN in 2nd OM for 90 % stenosis with good success.    He was admitted to [facility] in first week of August 2015  with upper back pain radiating towards bilateral arms.  His troponins were elevated and underwent coronary angiogram and noted severe stenosis of second OM status post drug-eluting stent.  Reviewed recent hospitalization in the [facility].  He is seeing  cardiologist  Dr. Jeff at [facility]   Aspirin for lifetime   completed Plavix , took  one year  Aggressive risk factor modifications with statin, complete cessation of smoking, beta blocker and ACE inhibitor.      Mixed Hyperlipidemia:    This taking atorvastatin 80 mg daily, excellent lipids.  Therapeutic lifestyle modification suggested  Weight loss suggested.      Benign essential  Hypertension:    Well controlled with low-dose lisinopril, metoprolol.  DASH Diet suggested  Lose weight and monitor  blood pressure outside       Nicotine dependence  Has been smoking since the seventh grade and completely quit for 12  weeks after first Hospitalization.  Restarted and smokes 2 cigs a day.  Offered help he wanted to quit on his own.  Time spent  For smoking cessation counseling 10 minutes.    Morbid obesity:    He lost 6 pounds since last visit , congratulated.  Educated and suggested continue same plan  Decrease calorie intake and exercise as tolerated.     small Lung nodule in a smoker:     Will arrange repeat CT chest , quit smoking.    Return to clinic in 3-4  months or sooner if any problems  CC: Primary care physician    Time spent today 35 minutes, face to face. More than 50% of the time spent counseling of the above mentioned medical issues.    This note was dictated by utilizing Dragon software.  CC: Primary care physician

## 2018-08-07 NOTE — PATIENT INSTRUCTIONS
Please go for CT chest for small lung nodule follow up    Quit smoking    Congratulations on losing weight, continue same plan    Continue current medications    Follow up in 3-4 months

## 2018-08-07 NOTE — MR AVS SNAPSHOT
After Visit Summary   8/7/2018    Mason Powers    MRN: [id number]           Patient Information     Date Of Birth          1965        Visit Information        Provider Department      8/7/2018 11:00 AM Niesha Lew MD [facility]      Today's Diagnoses     Coronary artery disease involving native coronary artery of native heart without angina pectoris s/p stent placement in Aug 2015.    -  1    Cigarette nicotine dependence with other nicotine-induced disorder        Mixed hyperlipidemia        Benign essential hypertension        Morbid obesity, unspecified obesity type (H)        Lung nodule, small 0.4 mm on routine CT           Care Instructions    Please go for CT chest for small lung nodule follow up    Quit smoking    Congratulations on losing weight, continue same plan    Continue current medications    Follow up in 3-4 months              Follow-ups after your visit        Follow-up notes from your care team     Return in about 4 months (around 12/7/2018).      Who to contact     If you have questions or need follow up information about today's clinic visit or your schedule please contact [facility] directly at [phone].  Normal or non-critical lab and imaging results will be communicated to you by MyChart, letter or phone within 4 business days after the clinic has received the results. If you do not hear from us within 7 days, please contact the clinic through MyChart or phone. If you have a critical or abnormal lab result, we will notify you by phone as soon as possible.  Submit refill requests through [employer] or call your pharmacy and they will forward the refill request to us. Please allow 3 business days for your refill to be completed.          Additional Information About Your Visit        Care EveryWhere ID     This is your Care EveryWhere ID. This could be used  by other organizations to access your [facility] medical records  [id number]        Your Vitals Were     Pulse Pulse Oximetry BMI (Body Mass Index)             46 96% 33.96 kg/m2          Blood Pressure from Last 3 Encounters:   08/07/18 113/75   03/28/18 120/73   01/08/18 130/80    Weight from Last 3 Encounters:   08/07/18 210 lb 6.4 oz (95.4 kg)   03/28/18 216 lb (98 kg)   01/08/18 226 lb 11.2 oz (102.8 kg)              We Performed the Following     Follow-Up with Vascular Medicine     SMOKING CESSATION COUNSELING >10 MIN        Primary Care Provider Office Phone # Fax #    Stephanie LEONARDO Hess [phone]       [place] [address]        Equal Access to Services     [facility] Hospital[facility]ESUS : Hadii aad ku hadasho Soshilpa, waaxda luqadaha, qaybta kaalmada adelucretiayajenny, hina stoner . So [facility] [phone].    ATENCIÓN: Si habla español, tiene a woodruff disposición servicios gratuitos de asistencia lingüística. Kristina al [phone].    We comply with applicable federal civil rights laws and [place] laws. We do not discriminate on the basis of race, color, national origin, age, disability, sex, sexual orientation, or gender identity.            Thank you!     Thank you for choosing [facility] [place]  for your care. Our goal is always to provide you with excellent care. Hearing back from our patients is one way we can continue to improve our services. Please take a few minutes to complete the written survey that you may receive in the mail after your visit with us. Thank you!             Your Updated Medication List - Protect others around you: Learn how to safely use, store and throw away your medicines at www.disposemymeds.org.          This list is accurate as of 8/7/18 11:33 AM.  Always use your most recent med list.                   Brand Name Dispense Instructions for use Diagnosis    aspirin 81 MG EC tablet      Take 1 tablet  (81 mg) by mouth daily    NSTEMI (non-ST elevated myocardial infarction) (H)       atorvastatin 80 MG tablet    LIPITOR    90 tablet    Take 1 tablet (80 mg) by mouth daily    Mixed hyperlipidemia, Coronary artery disease involving native coronary artery of native heart without angina pectoris, Epigastric pain, Benign essential hypertension, Cigarette nicotine dependence with other nicotine-induced disorder, Morbid obesity, unspecified obesity type (H)       metoprolol tartrate 25 MG tablet    LOPRESSOR    180 tablet    Take 1 tablet (25 mg) by mouth 2 times daily    Coronary artery disease involving native coronary artery of native heart without angina pectoris, Epigastric pain, Mixed hyperlipidemia, Benign essential hypertension, Cigarette nicotine dependence with other nicotine-induced disorder, Morbid obesity, unspecified obesity type (H)       nitroGLYcerin 0.4 MG sublingual tablet    NITROSTAT    25 tablet    Place 1 tablet (0.4 mg) under the tongue every 5 minutes as needed for chest pain    Non-STEMI (non-ST elevated myocardial infarction) (H)

## 2018-08-07 NOTE — NURSING NOTE
"Mason Powers is a 53 year old male who presents for:  Chief Complaint   Patient presents with     RECHECK     3 month F/U, labs on 7/17/18        Vitals:    Vitals:    08/07/18 1056   BP: 113/75   BP Location: Right arm   Patient Position: Chair   Cuff Size: Adult Large   Pulse: (!) 46   SpO2: 96%   Weight: 210 lb 6.4 oz (95.4 kg)       BMI:  Estimated body mass index is 33.96 kg/(m^2) as calculated from the following:    Height as of 1/8/18: 5' 6\" (1.676 m).    Weight as of this encounter: 210 lb 6.4 oz (95.4 kg).    Pain Score:  Data Unavailable        Sylvia Reed MA      "

## 2018-09-22 DIAGNOSIS — F17.218 CIGARETTE NICOTINE DEPENDENCE WITH OTHER NICOTINE-INDUCED DISORDER: ICD-10-CM

## 2018-09-22 DIAGNOSIS — E66.01 MORBID OBESITY, UNSPECIFIED OBESITY TYPE (H): ICD-10-CM

## 2018-09-22 DIAGNOSIS — E78.2 MIXED HYPERLIPIDEMIA: ICD-10-CM

## 2018-09-22 DIAGNOSIS — R10.13 EPIGASTRIC PAIN: ICD-10-CM

## 2018-09-22 DIAGNOSIS — I10 BENIGN ESSENTIAL HYPERTENSION: ICD-10-CM

## 2018-09-22 DIAGNOSIS — I25.10 CORONARY ARTERY DISEASE INVOLVING NATIVE CORONARY ARTERY OF NATIVE HEART WITHOUT ANGINA PECTORIS: ICD-10-CM

## 2018-09-24 RX ORDER — METOPROLOL TARTRATE 25 MG/1
TABLET, FILM COATED ORAL
Qty: 180 TABLET | Refills: 3 | Status: SHIPPED | OUTPATIENT
Start: 2018-09-24 | End: 2019-11-01

## 2018-09-24 NOTE — TELEPHONE ENCOUNTER
"Last Written Prescription Date:  8/29/17  Last Fill Quantity: 180,  # refills: 3   Last office visit: No previous visit found with prescribing provider:  8/7/18   Future Office Visit:    Requested Prescriptions   Pending Prescriptions Disp Refills     metoprolol tartrate (LOPRESSOR) 25 MG tablet [Pharmacy Med Name: METOPROLOL TARTRATE 25MG TABLETS] 180 tablet 0     Sig: TAKE 1 TABLET(25 MG) BY MOUTH TWICE DAILY    Beta-Blockers Protocol Passed    9/22/2018 12:41 PM       Passed - Blood pressure under 140/90 in past 12 months    BP Readings from Last 3 Encounters:   08/07/18 113/75   03/28/18 120/73   01/08/18 130/80                Passed - Patient is age 6 or older       Passed - Recent (12 mo) or future (30 days) visit within the authorizing provider's specialty    Patient had office visit in the last 12 months or has a visit in the next 30 days with authorizing provider or within the authorizing provider's specialty.  See \"Patient Info\" tab in inbasket, or \"Choose Columns\" in Meds & Orders section of the refill encounter.            Prescription approved per [facility] Refill Protocol.  Zoila Mello, RN, BSN    "

## 2018-10-02 ENCOUNTER — HOSPITAL ENCOUNTER (OUTPATIENT)
Dept: CT IMAGING | Facility: CLINIC | Age: 53
Discharge: HOME | End: 2018-10-02
Attending: INTERNAL MEDICINE | Admitting: INTERNAL MEDICINE
Payer: COMMERCIAL

## 2018-10-02 DIAGNOSIS — R91.1 LUNG NODULE: ICD-10-CM

## 2018-10-02 PROCEDURE — 71250 CT THORAX DX C-: CPT

## 2018-11-27 ENCOUNTER — OFFICE VISIT (OUTPATIENT)
Dept: OTHER | Facility: CLINIC | Age: 53
End: 2018-11-27
Attending: INTERNAL MEDICINE
Payer: COMMERCIAL

## 2018-11-27 VITALS
OXYGEN SATURATION: 96 % | HEART RATE: 57 BPM | DIASTOLIC BLOOD PRESSURE: 85 MMHG | WEIGHT: 213.2 LBS | SYSTOLIC BLOOD PRESSURE: 124 MMHG | BODY MASS INDEX: 34.41 KG/M2

## 2018-11-27 DIAGNOSIS — R73.01 IFG (IMPAIRED FASTING GLUCOSE): ICD-10-CM

## 2018-11-27 DIAGNOSIS — I25.10 CORONARY ARTERY DISEASE INVOLVING NATIVE CORONARY ARTERY OF NATIVE HEART WITHOUT ANGINA PECTORIS: Primary | ICD-10-CM

## 2018-11-27 DIAGNOSIS — E78.2 MIXED HYPERLIPIDEMIA: ICD-10-CM

## 2018-11-27 DIAGNOSIS — E78.5 DYSLIPIDEMIA: ICD-10-CM

## 2018-11-27 DIAGNOSIS — I25.10 CORONARY ARTERY DISEASE INVOLVING NATIVE CORONARY ARTERY OF NATIVE HEART WITHOUT ANGINA PECTORIS: ICD-10-CM

## 2018-11-27 DIAGNOSIS — E66.01 MORBID OBESITY, UNSPECIFIED OBESITY TYPE (H): ICD-10-CM

## 2018-11-27 DIAGNOSIS — F17.218 CIGARETTE NICOTINE DEPENDENCE WITH OTHER NICOTINE-INDUCED DISORDER: ICD-10-CM

## 2018-11-27 DIAGNOSIS — I10 BENIGN ESSENTIAL HYPERTENSION: ICD-10-CM

## 2018-11-27 DIAGNOSIS — R10.13 EPIGASTRIC PAIN: ICD-10-CM

## 2018-11-27 DIAGNOSIS — R91.8 PULMONARY NODULES: ICD-10-CM

## 2018-11-27 PROCEDURE — G0463 HOSPITAL OUTPT CLINIC VISIT: HCPCS

## 2018-11-27 PROCEDURE — 99214 OFFICE O/P EST MOD 30 MIN: CPT | Mod: ZP | Performed by: INTERNAL MEDICINE

## 2018-11-27 RX ORDER — ATORVASTATIN CALCIUM 80 MG/1
TABLET, FILM COATED ORAL
Qty: 90 TABLET | Refills: 3 | Status: SHIPPED | OUTPATIENT
Start: 2018-11-27 | End: 2020-01-13

## 2018-11-27 NOTE — PROGRESS NOTES
SUBJECTIVE:  CC:   Follow up visit, underwent Ct chest for lung nodule follow up   No abdominal pain  Still smoking cigarettes.  No chest pain, shortness of breath or palpitations  He initially presented to [facility] in 2015 with abdominal pain workup revealed mild narrowing of the proximal SMA with inflammatory changes adjacent to the proximal SMA and at that time differential was dissection or vasculitis and he did well no recurrence of abdominal pain.  HPI:  Mason Powers is a 53 year old male with past medical history of hypertension, obesity, nicotine dependence, coronary artery disease status post drug-eluting stent, dyslipidemia, small multiple lung nodules here today for the follow-up visit.  He underwent CT chest last month for lung nodule follow-up still small multiple nodules noted and is asymptomatic.  He still smokes in the past he quit successfully and is working on quitting smoking.  He denies any chest pain, shortness of breath or palpitations and is scheduled to see Dr. Dinero cardiologist in January 2019 and prior to that he is supposed to undergo stress test.  He did not require any nitroglycerin use within the last year.  Blood pressure is excellent and previous lipid panel is excellent except triglycerides elevated after the holidays last year.  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  Reviewed recent CT chest with the patient  HISTORIES:  PROBLEM LIST:   Patient Active Problem List   Diagnosis     Abdominal pain     Hypertension     Morbid obesity (H)     Nicotine dependence     NSTEMI (non-ST elevated myocardial infarction) (H)     CAD (coronary artery disease)     Dyslipidemia     Lung nodule, small 0.4 mm on routine CT needs repeat CT in june 2016.     Epigastric discomfort     PAST MEDICAL HISTORY:  Past Medical History:   Diagnosis Date     Abdominal pain 6/2015    with proximal  SMA inflammation vasculatis w/u negative.no discetion     CAD (coronary artery disease) 8/2015    RUTH ANN-OM, mod RCA and mod/sev small PDA, mild-mod diffuse  Lad, Diag small with mod/sev     Dyslipidaemia      GERD (gastroesophageal reflux disease)      HTN (hypertension)      Lung nodule     very small 0.4 mm incidental finding on CT.     Morbid obesity (H) 7/7/2015     Nicotine dependence      NSTEMI (non-ST elevated myocardial infarction) (H) 2015     Uncomplicated asthma      PAST SURGICAL HISTORY:  Past Surgical History:   Procedure Laterality Date     STENT, CORONARY, S660 15/18  2015     RUTH ANN-OM2( 50%RCA, 50-70 PDA,50% LAD, diag small >50%, RUTH ANN to OM     CURRENT MEDICATIONS:  Current Outpatient Prescriptions   Medication Sig Dispense Refill     aspirin EC 81 MG EC tablet Take 1 tablet (81 mg) by mouth daily       atorvastatin (LIPITOR) 80 MG tablet Take 1 tablet (80 mg) by mouth daily 90 tablet 3     metoprolol tartrate (LOPRESSOR) 25 MG tablet TAKE 1 TABLET(25 MG) BY MOUTH TWICE DAILY 180 tablet 3     nitroGLYcerin (NITROSTAT) 0.4 MG sublingual tablet Place 1 tablet (0.4 mg) under the tongue every 5 minutes as needed for chest pain 25 tablet 3     ALLERGIES:  Allergies   Allergen Reactions     Shrimp Difficulty breathing     Throat starts to swell      SOCIAL HISTORY:  Social History     Social History     Marital status:      Spouse name: Aurea     Number of children: 2     Years of education: N/A     Occupational History                Social History Main Topics     Smoking status: Current Every Day Smoker     Types: Cigarettes     Smokeless tobacco: Former User     Quit date: 7/29/2015      Comment: 3-4 cigarettes daily, smoking for the last 30 plus years. quit but restarted 1 pk/week     Alcohol use 0.0 oz/week     0 Standard drinks or equivalent per week      Comment: socially     Drug use: No      Comment: recently quit      Sexual activity: Yes     Partners: Female     Other  Topics Concern     Caffeine Concern Yes     1 pot a coffee occas days      Special Diet Yes     more wheat/grains, less sugar, leaner meats     Exercise Yes     get some walking during the week      Social History Narrative     FAMILY HISTORY:  Family History   Problem Relation Age of Onset     Diabetes Father      Diabetes Brother      REVIEW OF SYSTEMS:  CONSTITUTIONAL:no malaise, fatigue, or other general symptoms  EYES: no subjective changes in visual acuity, no photophobia  ENT/MOUTH: no complaints of rhinorrhea, nasal congestion, sore throat, hearing changes  RESP:no SOB  CV: no c/o exertional chest pressure or LIZ  GI: No abdominal pain, constipation, change in bowel movements, nausea, pyrosis, BRBPR  :no polyuria or polydipsia, no dysuria, no gross hematuria  MUSCULOSKELATAL:no arthalgias or myalgias  INTEGUMENTARY/SKIN: no pruritis, rashes, or moles with recent change in size, shape, or pigmentation  NEURO: no gross sensory or motor symptoms, no dizziness, no confusion  ENDOCRINE: no polyuria or polydipsia, no heat or cold intolerance  HEME/ALLERGY/IMMUNE: no fevers, chills, night sweats, or unwanted weight loss  PSYCHIATRIC: no depression, anxiety, or internal stimuli  EXAM:  /85 (BP Location: Right arm, Patient Position: Chair, Cuff Size: Adult Large)  Pulse 57  Wt 213 lb 3.2 oz (96.7 kg)  SpO2 96%  BMI 34.41 kg/m2  BMI: Body mass index is 34.41 kg/(m^2).  GENERAL APPEARANCE:  Pleasant  Healthy appearing male , alert, active, no distress cooperative.  EXAM:  EYES: clear conjunctiva, no cataracts, no obvious fundoscopic abnormalities  HENT: oropharynx, nares, and TMs are WNL  NECK: no JVD, thyromegaly or lymphadenopathy, no cervical bruits  RESP: clear to auscultation without rales, wheezes, or rhonchi  CV: RRR, no murmurs, gallops, or rubs  LYMPH: no cervical , axillary, or inguinal lymphadenopathy appreciated  GI: NABS, ND/NT, no masses or organomegally appreciated  : normal external  genitalia and anus, no lumps, masses  MS: no obvoius clinicallly relevant arthropathy, no evidence vasculitis  SKIN: no nevi clinically suspicious for malignancy are noted  NEURO: CN II-XII intact, no localizing sensory or motor abnoramlities noted, DTRs symmetrical bilaterally  PSYCH: Mental status exam reveals the pt to have normal mood and affect. There is no disorder of thought form or content. There is no response to internal stimuli. There is no suicidal or homicidal ideation.    Imaging:    Results for orders placed or performed during the hospital encounter of 10/02/18   CT Chest w/o Contrast    Narrative    CT CHEST WITHOUT CONTRAST October 2, 2018 9:39 AM     HISTORY: Lung nodule.    TECHNIQUE: No IV contrast material. Radiation dose for this scan was  reduced using automated exposure control, adjustment of the mA and/or  kV according to patient size, or iterative reconstruction technique.    COMPARISON: None.    FINDINGS: There is a 6 mm nodule in the right lower lobe on image 172  series 6. There is a 3 mm nodule in the lingula on image 177 series 6.  There is a 4 mm nodule in the left lower lobe on image 119 series 6.    The cardiomediastinal structures in the thorax appear unremarkable.      Impression    IMPRESSION: Tiny lung nodules. The largest measures 6 mm in diameter.    Recommendations for an incidental lung nodule = or > 6mm to 8mm:    Low risk patients: Initial follow-up CT at 6-12 months, then  consider CT at 18-24 months if no change.    High risk patients: Initial follow-up CT at 6-12 months, then CT at  18-24 months if no change.    *Low Risk: Minimal or absent history of smoking or other known risk  factors.  *Nonsolid (ground-glass) or partly solid nodules may require longer  follow-up to exclude indolent adenocarcinoma.  *Recommendations based on Guidelines for the Management of Incidental  Pulmonary Nodules Detected at CT: From the Fleischner Society 2017,  Radiology 2017.    ALEJANDRO  MD JUANI     A/P:  (I25.10) Coronary artery disease involving native coronary artery of native heart without angina pectoris  (primary encounter diagnosis)  Doing well no symptoms currently taking statin, aspirin, beta-blocker excellent blood pressure and heart rate  Not requiring any nitroglycerin  Scheduled to undergo stress test before next appointment in January with Dr. Dinero  Continue the same plan  Quit smoking, see below  (I10) Benign essential hypertension  Comment: Well controlled with low-dose beta-blocker continue the same quit smoking.  DASH diet discussed with the patient and lose weight  Plan:     (F17.218) Cigarette nicotine dependence with other nicotine-induced disorder  Comment: He successfully quit smoking some time ago cold turkey again restarted few cigarettes a day.  Educated again benefits of quitting smoking offered help and he wanted to quit on his own  Plan: Smoking cessation counseling time spent 5 minutes today    (E78.5) Dyslipidemia  Comment: Well controlled with atorvastatin 80 mg daily continue the same lose weight.  Therapeutic lifestyle modification suggested  Plan:     (R91.8) Pulmonary nodules  Comment: Asymptomatic and stable compared to previous CT reviewed CT scan results with the patient.  Will repeat CT chest in 1 year  Quit smoking  Plan:     I have discussed with patient the risks, benefits, medications, treatment options and modalities.   I have instructed the patient to call or schedule a follow-up appointment if any problems or failure to improve.    This note was dictated by utilizing dragon software    Total patient care time spent more than 30 minutes face-to-face and more than 50% of the time spent counseling of the above-mentioned multiple issues and reviewed imaging studies    Return to clinic in 6 months    Niesha Lew MD,[facility]  Vascular medicine

## 2018-11-27 NOTE — NURSING NOTE
"Mason Powers is a 53 year old male who presents for:  Chief Complaint   Patient presents with     RECHECK     pt wants to f/u results recent CT chest on 10/2/18, last saw Dr Lew on 8/7/18        Vitals:    Vitals:    11/27/18 0823   BP: 124/85   BP Location: Right arm   Patient Position: Chair   Cuff Size: Adult Large   Pulse: 57   SpO2: 96%   Weight: 213 lb 3.2 oz (96.7 kg)       BMI:  Estimated body mass index is 34.41 kg/(m^2) as calculated from the following:    Height as of 1/8/18: 5' 6\" (1.676 m).    Weight as of this encounter: 213 lb 3.2 oz (96.7 kg).    Pain Score:  Data Unavailable        Sylvia Reed MA      "

## 2018-11-27 NOTE — TELEPHONE ENCOUNTER
"Last Written Prescription Date:  8/29/17  Last Fill Quantity: 90,  # refills: 3   Last office visit: No previous visit found with prescribing provider:  11/27/18   Future Office Visit:    Requested Prescriptions   Pending Prescriptions Disp Refills     atorvastatin (LIPITOR) 80 MG tablet [Pharmacy Med Name: ATORVASTATIN 80MG TABLETS] 90 tablet 0     Sig: TAKE 1 TABLET(80 MG) BY MOUTH DAILY    Statins Protocol Passed    11/27/2018 10:37 AM       Passed - LDL on file in past 12 months    Recent Labs   Lab Test  07/17/18   0854   LDL  26            Passed - No abnormal creatine kinase in past 12 months    No lab results found.            Passed - Recent (12 mo) or future (30 days) visit within the authorizing provider's specialty    Patient had office visit in the last 12 months or has a visit in the next 30 days with authorizing provider or within the authorizing provider's specialty.  See \"Patient Info\" tab in inbasket, or \"Choose Columns\" in Meds & Orders section of the refill encounter.             Passed - Patient is age 18 or older        Prescription approved per [facility] Refill Protocol.  Zoila Mello, RN, BSN    "

## 2018-11-27 NOTE — MR AVS SNAPSHOT
After Visit Summary   11/27/2018    Mason Powers    MRN: [id number]           Patient Information     Date Of Birth          1965        Visit Information        Provider Department      11/27/2018 8:30 AM Niesha Lew MD [facility]      Today's Diagnoses     Coronary artery disease involving native coronary artery of native heart without angina pectoris    -  1    Benign essential hypertension        Cigarette nicotine dependence with other nicotine-induced disorder        Dyslipidemia        Pulmonary nodules        IFG (impaired fasting glucose)          Care Instructions    Quit smoking    Go for fasting labs and stress test before Dr. Dinero appointment.    See me in 6 months              Follow-ups after your visit        Follow-up notes from your care team     Return in about 6 months (around 5/27/2019).      Future tests that were ordered for you today     Open Future Orders        Priority Expected Expires Ordered    SMOKING CESSATION COUNSELING 3-10 MIN Routine 1/1/2019 4/27/2019 11/27/2018    Comprehensive metabolic panel Routine 1/1/2019 4/27/2019 11/27/2018    Hemoglobin A1c Routine 1/1/2019 4/27/2019 11/27/2018    Lipid Profile Routine 1/1/2019 4/27/2019 11/27/2018            Who to contact     If you have questions or need follow up information about today's clinic visit or your schedule please contact [facility] directly at [phone].  Normal or non-critical lab and imaging results will be communicated to you by MyChart, letter or phone within 4 business days after the clinic has received the results. If you do not hear from us within 7 days, please contact the clinic through MyChart or phone. If you have a critical or abnormal lab result, we will notify you by phone as soon as possible.  Submit refill requests through [employer] or call your pharmacy and they will  forward the refill request to us. Please allow 3 business days for your refill to be completed.          Additional Information About Your Visit        Care EveryWhere ID     This is your Care EveryWhere ID. This could be used by other organizations to access your [facility] medical records  [id number]        Your Vitals Were     Pulse Pulse Oximetry BMI (Body Mass Index)             57 96% 34.41 kg/m2          Blood Pressure from Last 3 Encounters:   11/27/18 124/85   08/07/18 113/75   03/28/18 120/73    Weight from Last 3 Encounters:   11/27/18 213 lb 3.2 oz (96.7 kg)   08/07/18 210 lb 6.4 oz (95.4 kg)   03/28/18 216 lb (98 kg)               Primary Care Provider Office Phone # Fax #    Stephanie Hess [phone]       [place] [id number]        Equal Access to Services     GENARO LOGAN : Hadii ebony scott hadasho Sodebali, waaxda luqadaha, qaybta kaalmada adeegyada, hina stoner . So [facility] [phone].    ATENCIÓN: Si habla español, tiene a woodruff disposición servicios gratuitos de asistencia lingüística. Kristina al [phone].    We comply with applicable federal civil rights laws and [place] laws. We do not discriminate on the basis of race, color, national origin, age, disability, sex, sexual orientation, or gender identity.            Thank you!     Thank you for choosing [facility] [place]  for your care. Our goal is always to provide you with excellent care. Hearing back from our patients is one way we can continue to improve our services. Please take a few minutes to complete the written survey that you may receive in the mail after your visit with us. Thank you!             Your Updated Medication List - Protect others around you: Learn how to safely use, store and throw away your medicines at www.disposemymeds.org.          This list is accurate as of 11/27/18  9:04 AM.  Always use your most recent med  list.                   Brand Name Dispense Instructions for use Diagnosis    aspirin 81 MG EC tablet    ASA     Take 1 tablet (81 mg) by mouth daily    NSTEMI (non-ST elevated myocardial infarction) (H)       atorvastatin 80 MG tablet    LIPITOR    90 tablet    Take 1 tablet (80 mg) by mouth daily    Mixed hyperlipidemia, Coronary artery disease involving native coronary artery of native heart without angina pectoris, Epigastric pain, Benign essential hypertension, Cigarette nicotine dependence with other nicotine-induced disorder, Morbid obesity, unspecified obesity type (H)       metoprolol tartrate 25 MG tablet    LOPRESSOR    180 tablet    TAKE 1 TABLET(25 MG) BY MOUTH TWICE DAILY    Coronary artery disease involving native coronary artery of native heart without angina pectoris, Epigastric pain, Mixed hyperlipidemia, Benign essential hypertension, Cigarette nicotine dependence with other nicotine-induced disorder, Morbid obesity, unspecified obesity type (H)       nitroGLYcerin 0.4 MG sublingual tablet    NITROSTAT    25 tablet    Place 1 tablet (0.4 mg) under the tongue every 5 minutes as needed for chest pain    Non-STEMI (non-ST elevated myocardial infarction) (H)

## 2018-11-27 NOTE — PATIENT INSTRUCTIONS
Quit smoking    Go for fasting labs and stress test before Dr. Dinero appointment.    See me in 6 months

## 2019-07-05 ENCOUNTER — TELEPHONE (OUTPATIENT)
Dept: OTHER | Facility: CLINIC | Age: 54
End: 2019-07-05

## 2019-07-05 DIAGNOSIS — E66.01 MORBID OBESITY, UNSPECIFIED OBESITY TYPE (H): ICD-10-CM

## 2019-07-05 DIAGNOSIS — E78.5 DYSLIPIDEMIA: ICD-10-CM

## 2019-07-05 DIAGNOSIS — E78.2 MIXED HYPERLIPIDEMIA: Primary | ICD-10-CM

## 2019-07-05 DIAGNOSIS — R73.01 IFG (IMPAIRED FASTING GLUCOSE): ICD-10-CM

## 2019-07-05 DIAGNOSIS — I25.10 CORONARY ARTERY DISEASE INVOLVING NATIVE CORONARY ARTERY OF NATIVE HEART WITHOUT ANGINA PECTORIS: ICD-10-CM

## 2019-07-08 NOTE — TELEPHONE ENCOUNTER
Orders Lipid Profile, Hgb A1C and CMP reordered with renewed expiration date.    Maryann Lainez RN BSN CMSRN

## 2019-07-23 DIAGNOSIS — R73.01 IFG (IMPAIRED FASTING GLUCOSE): ICD-10-CM

## 2019-07-23 DIAGNOSIS — E78.5 DYSLIPIDEMIA: ICD-10-CM

## 2019-07-23 DIAGNOSIS — E78.2 MIXED HYPERLIPIDEMIA: ICD-10-CM

## 2019-07-23 DIAGNOSIS — I25.10 CORONARY ARTERY DISEASE INVOLVING NATIVE CORONARY ARTERY OF NATIVE HEART WITHOUT ANGINA PECTORIS: ICD-10-CM

## 2019-07-23 DIAGNOSIS — E66.01 MORBID OBESITY, UNSPECIFIED OBESITY TYPE (H): ICD-10-CM

## 2019-07-23 LAB
ALBUMIN SERPL-MCNC: 3.6 G/DL (ref 3.4–5)
ALP SERPL-CCNC: 113 U/L (ref 40–150)
ALT SERPL W P-5'-P-CCNC: 56 U/L (ref 0–70)
ANION GAP SERPL CALCULATED.3IONS-SCNC: 8 MMOL/L (ref 3–14)
AST SERPL W P-5'-P-CCNC: 27 U/L (ref 0–45)
BILIRUB SERPL-MCNC: 0.6 MG/DL (ref 0.2–1.3)
BUN SERPL-MCNC: 14 MG/DL (ref 7–30)
CALCIUM SERPL-MCNC: 8.2 MG/DL (ref 8.5–10.1)
CHLORIDE SERPL-SCNC: 107 MMOL/L (ref 94–109)
CHOLEST SERPL-MCNC: 109 MG/DL
CO2 SERPL-SCNC: 24 MMOL/L (ref 20–32)
CREAT SERPL-MCNC: 0.87 MG/DL (ref 0.66–1.25)
GFR SERPL CREATININE-BSD FRML MDRD: >90 ML/MIN/{1.73_M2}
GLUCOSE SERPL-MCNC: 108 MG/DL (ref 70–99)
HBA1C MFR BLD: 5.7 % (ref 0–5.6)
HDLC SERPL-MCNC: 33 MG/DL
LDLC SERPL CALC-MCNC: 47 MG/DL
NONHDLC SERPL-MCNC: 76 MG/DL
POTASSIUM SERPL-SCNC: 3.9 MMOL/L (ref 3.4–5.3)
PROT SERPL-MCNC: 6.7 G/DL (ref 6.8–8.8)
SODIUM SERPL-SCNC: 139 MMOL/L (ref 133–144)
TRIGL SERPL-MCNC: 147 MG/DL

## 2019-07-23 PROCEDURE — 80053 COMPREHEN METABOLIC PANEL: CPT | Performed by: INTERNAL MEDICINE

## 2019-07-23 PROCEDURE — 36415 COLL VENOUS BLD VENIPUNCTURE: CPT | Performed by: INTERNAL MEDICINE

## 2019-07-23 PROCEDURE — 83036 HEMOGLOBIN GLYCOSYLATED A1C: CPT | Performed by: INTERNAL MEDICINE

## 2019-07-23 PROCEDURE — 80061 LIPID PANEL: CPT | Performed by: INTERNAL MEDICINE

## 2019-07-30 ENCOUNTER — OFFICE VISIT (OUTPATIENT)
Dept: OTHER | Facility: CLINIC | Age: 54
End: 2019-07-30
Attending: INTERNAL MEDICINE
Payer: COMMERCIAL

## 2019-07-30 VITALS
OXYGEN SATURATION: 96 % | WEIGHT: 225 LBS | RESPIRATION RATE: 16 BRPM | DIASTOLIC BLOOD PRESSURE: 85 MMHG | HEIGHT: 68 IN | BODY MASS INDEX: 34.1 KG/M2 | SYSTOLIC BLOOD PRESSURE: 132 MMHG | HEART RATE: 67 BPM

## 2019-07-30 DIAGNOSIS — R73.01 IFG (IMPAIRED FASTING GLUCOSE): ICD-10-CM

## 2019-07-30 DIAGNOSIS — E78.5 DYSLIPIDEMIA: ICD-10-CM

## 2019-07-30 DIAGNOSIS — I10 BENIGN ESSENTIAL HYPERTENSION: ICD-10-CM

## 2019-07-30 DIAGNOSIS — I25.10 CORONARY ARTERY DISEASE INVOLVING NATIVE CORONARY ARTERY OF NATIVE HEART WITHOUT ANGINA PECTORIS: Primary | ICD-10-CM

## 2019-07-30 DIAGNOSIS — F17.218 CIGARETTE NICOTINE DEPENDENCE WITH OTHER NICOTINE-INDUCED DISORDER: ICD-10-CM

## 2019-07-30 PROCEDURE — 99214 OFFICE O/P EST MOD 30 MIN: CPT | Mod: ZP | Performed by: INTERNAL MEDICINE

## 2019-07-30 PROCEDURE — 99406 BEHAV CHNG SMOKING 3-10 MIN: CPT | Mod: ZP | Performed by: INTERNAL MEDICINE

## 2019-07-30 PROCEDURE — G0463 HOSPITAL OUTPT CLINIC VISIT: HCPCS

## 2019-07-30 RX ORDER — NITROGLYCERIN 0.4 MG/1
0.4 TABLET SUBLINGUAL EVERY 5 MIN PRN
Qty: 25 TABLET | Refills: 3 | Status: SHIPPED | OUTPATIENT
Start: 2019-07-30 | End: 2020-08-11

## 2019-07-30 ASSESSMENT — MIFFLIN-ST. JEOR: SCORE: 1845.09

## 2019-07-30 NOTE — PROGRESS NOTES
"Mason Powers is a 54 year old male who presents for:  Chief Complaint   Patient presents with     RECHECK     follow up - labs done 07/23/19 *LMB 07/05/19        Vitals:    Vitals:    07/30/19 1354   BP: 132/85   BP Location: Left arm   Patient Position: Chair   Cuff Size: Adult Regular   Pulse: 67   Resp: 16   SpO2: 96%   Weight: 225 lb (102.1 kg)   Height: 5' 8\" (1.727 m)       BMI:  Estimated body mass index is 34.21 kg/m  as calculated from the following:    Height as of this encounter: 5' 8\" (1.727 m).    Weight as of this encounter: 225 lb (102.1 kg).    Pain Score:  Data Unavailable        Arielle Perez    "

## 2019-07-30 NOTE — PROGRESS NOTES
SUBJECTIVE:  CC:   Follow up visit  Review of recent lab test results  No abdominal pain  Still smoking 3-4 cigarettes daily  No chest pain, shortness of breath or palpitations  He initially presented to [facility] in 2015 with abdominal pain workup revealed mild narrowing of the proximal SMA with inflammatory changes adjacent to the proximal SMA and at that time differential was dissection or vasculitis and he did well no recurrence of abdominal pain.  HPI:  Mason Powers is a 54 year old male with past medical history of hypertension, obesity, nicotine dependence, coronary artery disease status post drug-eluting stent, dyslipidemia, small multiple lung nodules here today for the follow-up visit.  He underwent CT chest last month for lung nodule follow-up still small multiple nodules noted and is asymptomatic.  He still smokes in the past he quit successfully and is working on quitting smoking.  He denies any chest pain, shortness of breath or palpitations and is scheduled to see Dr. Dinero cardiologist in January 2019 and prior to that he is supposed to undergo stress test.  He did not require any nitroglycerin use within the last year.  Blood pressure is excellent and previous lipid panel is excellent except triglycerides elevated after the holidays last year.  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  Reviewed recent CT chest and labs  with the patient  HISTORIES:  PROBLEM LIST:   Patient Active Problem List   Diagnosis     Abdominal pain     Hypertension     Morbid obesity (H)     Nicotine dependence     NSTEMI (non-ST elevated myocardial infarction) (H)     CAD (coronary artery disease)     Dyslipidemia     Lung nodule, small 0.4 mm on routine CT needs repeat CT in june 2016.     Epigastric discomfort     PAST MEDICAL HISTORY:  Past Medical History:   Diagnosis Date     Abdominal pain 6/2015    with  proximal SMA inflammation vasculatis w/u negative.no discetion     CAD (coronary artery disease) 8/2015    RUTH ANN-OM, mod RCA and mod/sev small PDA, mild-mod diffuse  Lad, Diag small with mod/sev     Dyslipidaemia      GERD (gastroesophageal reflux disease)      HTN (hypertension)      Lung nodule     very small 0.4 mm incidental finding on CT.     Morbid obesity (H) 7/7/2015     Nicotine dependence      NSTEMI (non-ST elevated myocardial infarction) (H) 2015     Uncomplicated asthma      PAST SURGICAL HISTORY:  Past Surgical History:   Procedure Laterality Date     STENT, CORONARY, S660 15/18  2015     RUTH ANN-OM2( 50%RCA, 50-70 PDA,50% LAD, diag small >50%, RUTH ANN to OM     CURRENT MEDICATIONS:  Current Outpatient Medications   Medication Sig Dispense Refill     aspirin EC 81 MG EC tablet Take 1 tablet (81 mg) by mouth daily       atorvastatin (LIPITOR) 80 MG tablet TAKE 1 TABLET(80 MG) BY MOUTH DAILY 90 tablet 3     metoprolol tartrate (LOPRESSOR) 25 MG tablet TAKE 1 TABLET(25 MG) BY MOUTH TWICE DAILY 180 tablet 3     nitroGLYcerin (NITROSTAT) 0.4 MG sublingual tablet Place 1 tablet (0.4 mg) under the tongue every 5 minutes as needed for chest pain 25 tablet 3     ALLERGIES:  Allergies   Allergen Reactions     Shrimp Difficulty breathing     Throat starts to swell      SOCIAL HISTORY:  Social History     Socioeconomic History     Marital status:      Spouse name: Aurea     Number of children: 2     Years of education: Not on file     Highest education level: Not on file   Occupational History     Occupation:      Comment:    Social Needs     Financial resource strain: Not on file     Food insecurity:     Worry: Not on file     Inability: Not on file     Transportation needs:     Medical: Not on file     Non-medical: Not on file   Tobacco Use     Smoking status: Current Every Day Smoker     Types: Cigarettes     Smokeless tobacco: Former User     Quit date: 7/29/2015     Tobacco comment: 3-4  cigarettes daily, smoking for the last 30 plus years. quit but restarted 1 pk/week   Substance and Sexual Activity     Alcohol use: Yes     Alcohol/week: 0.0 oz     Comment: socially     Drug use: No     Comment: recently quit      Sexual activity: Yes     Partners: Female   Lifestyle     Physical activity:     Days per week: Not on file     Minutes per session: Not on file     Stress: Not on file   Relationships     Social connections:     Talks on phone: Not on file     Gets together: Not on file     Attends Jewish service: Not on file     Active member of club or organization: Not on file     Attends meetings of clubs or organizations: Not on file     Relationship status: Not on file     Intimate partner violence:     Fear of current or ex partner: Not on file     Emotionally abused: Not on file     Physically abused: Not on file     Forced sexual activity: Not on file   Other Topics Concern     Parent/sibling w/ CABG, MI or angioplasty before 65F 55M? Not Asked      Service Not Asked     Blood Transfusions Not Asked     Caffeine Concern Yes     Comment: 1 pot a coffee occas days      Occupational Exposure Not Asked     Hobby Hazards Not Asked     Sleep Concern Not Asked     Stress Concern Not Asked     Weight Concern Not Asked     Special Diet Yes     Comment: more wheat/grains, less sugar, leaner meats     Back Care Not Asked     Exercise Yes     Comment: get some walking during the week      Bike Helmet Not Asked     Seat Belt Not Asked     Self-Exams Not Asked   Social History Narrative     Not on file     FAMILY HISTORY:  Family History   Problem Relation Age of Onset     Diabetes Father      Diabetes Brother      REVIEW OF SYSTEMS:  CONSTITUTIONAL:no malaise, fatigue, or other general symptoms  EYES: no subjective changes in visual acuity, no photophobia  ENT/MOUTH: no complaints of rhinorrhea, nasal congestion, sore throat, hearing changes  RESP:no SOB  CV: no c/o exertional chest pressure or  "LIZ  GI: No abdominal pain, constipation, change in bowel movements, nausea, pyrosis, BRBPR  :no polyuria or polydipsia, no dysuria, no gross hematuria  MUSCULOSKELATAL:no arthalgias or myalgias  INTEGUMENTARY/SKIN: no pruritis, rashes, or moles with recent change in size, shape, or pigmentation  NEURO: no gross sensory or motor symptoms, no dizziness, no confusion  ENDOCRINE: no polyuria or polydipsia, no heat or cold intolerance  HEME/ALLERGY/IMMUNE: no fevers, chills, night sweats, or unwanted weight loss  PSYCHIATRIC: no depression, anxiety, or internal stimuli  EXAM:  /85 (BP Location: Left arm, Patient Position: Chair, Cuff Size: Adult Regular)   Pulse 67   Resp 16   Ht 5' 8\" (1.727 m)   Wt 225 lb (102.1 kg)   SpO2 96%   BMI 34.21 kg/m    BMI: Body mass index is 34.21 kg/m .  GENERAL APPEARANCE:  Pleasant  Healthy appearing male , alert, active, no distress cooperative.  EXAM:  EYES: clear conjunctiva, no cataracts, no obvious fundoscopic abnormalities  HENT: oropharynx, nares, and TMs are WNL  NECK: no JVD, thyromegaly or lymphadenopathy, no cervical bruits  RESP: clear to auscultation without rales, wheezes, or rhonchi  CV: RRR, no murmurs, gallops, or rubs  LYMPH: no cervical , axillary, or inguinal lymphadenopathy appreciated  GI: NABS, ND/NT, no masses or organomegally appreciated  : normal external genitalia and anus, no lumps, masses  MS: no obvoius clinicallly relevant arthropathy, no evidence vasculitis  SKIN: no nevi clinically suspicious for malignancy are noted  NEURO: CN II-XII intact, no localizing sensory or motor abnoramlities noted, DTRs symmetrical bilaterally  PSYCH: Mental status exam reveals the pt to have normal mood and affect. There is no disorder of thought form or content. There is no response to internal stimuli. There is no suicidal or homicidal ideation.    Imaging:    Results for orders placed or performed in visit on 07/23/19   Comprehensive metabolic panel "   Result Value Ref Range    Sodium 139 133 - 144 mmol/L    Potassium 3.9 3.4 - 5.3 mmol/L    Chloride 107 94 - 109 mmol/L    Carbon Dioxide 24 20 - 32 mmol/L    Anion Gap 8 3 - 14 mmol/L    Glucose 108 (H) 70 - 99 mg/dL    Urea Nitrogen 14 7 - 30 mg/dL    Creatinine 0.87 0.66 - 1.25 mg/dL    GFR Estimate >90 >60 mL/min/[1.73_m2]    GFR Estimate If Black >90 >60 mL/min/[1.73_m2]    Calcium 8.2 (L) 8.5 - 10.1 mg/dL    Bilirubin Total 0.6 0.2 - 1.3 mg/dL    Albumin 3.6 3.4 - 5.0 g/dL    Protein Total 6.7 (L) 6.8 - 8.8 g/dL    Alkaline Phosphatase 113 40 - 150 U/L    ALT 56 0 - 70 U/L    AST 27 0 - 45 U/L   Hemoglobin A1c   Result Value Ref Range    Hemoglobin A1C 5.7 (H) 0 - 5.6 %   Lipid Profile   Result Value Ref Range    Cholesterol 109 <200 mg/dL    Triglycerides 147 <150 mg/dL    HDL Cholesterol 33 (L) >39 mg/dL    LDL Cholesterol Calculated 47 <100 mg/dL    Non HDL Cholesterol 76 <130 mg/dL     A/P:  (I25.10) Coronary artery disease involving native coronary artery of native heart without angina pectoris  (primary encounter diagnosis)  Doing well no symptoms currently taking statin, aspirin, beta-blocker excellent blood pressure and heart rate  Not requiring any nitroglycerin requesting refill done.  Continue the same plan  Quit smoking, see below  (I10) Benign essential hypertension  Comment: Well controlled with low-dose beta-blocker continue the same quit smoking.  DASH diet discussed with the patient and lose weight  Plan:     (F17.218) Cigarette nicotine dependence with other nicotine-induced disorder  Comment: He successfully quit smoking some time ago cold turkey again restarted few cigarettes a day.  Educated again benefits of quitting smoking offered help and he wanted to quit on his own  Plan: Smoking cessation counseling time spent 5 minutes today    (E78.5) Dyslipidemia  Comment: Well controlled with atorvastatin 80 mg daily continue the same lose weight.  Therapeutic lifestyle modification  suggested  Plan:     (R91.8) Pulmonary nodules  Comment: Asymptomatic and stable compared to previous CT reviewed CT scan results with the patient.  Will repeat CT chest in 1 year  Quit smoking  Plan:     I have discussed with patient the risks, benefits, medications, treatment options and modalities.   I have instructed the patient to call or schedule a follow-up appointment if any problems or failure to improve.    This note was dictated by utilizing dragon software    Total patient care time spent more than 30 minutes face-to-face and more than 50% of the time spent counseling of the above-mentioned multiple issues and reviewed imaging studies    Return to clinic in 6 months    Niesha Lew MD,FS,[facility]  Vascular medicine

## 2019-07-30 NOTE — PATIENT INSTRUCTIONS
Quit smoking    Got for labs before visit    Lose 5-10 pounds    Avoid sweets and juices.    Exercise as tolerated    Continue same meds

## 2019-10-07 ENCOUNTER — RECORDS - HEALTHEAST (OUTPATIENT)
Dept: ADMINISTRATIVE | Facility: OTHER | Age: 54
End: 2019-10-07

## 2019-10-26 ENCOUNTER — RECORDS - HEALTHEAST (OUTPATIENT)
Dept: ADMINISTRATIVE | Facility: OTHER | Age: 54
End: 2019-10-26

## 2019-11-01 DIAGNOSIS — R10.13 EPIGASTRIC PAIN: ICD-10-CM

## 2019-11-01 DIAGNOSIS — I25.10 CORONARY ARTERY DISEASE INVOLVING NATIVE CORONARY ARTERY OF NATIVE HEART WITHOUT ANGINA PECTORIS: ICD-10-CM

## 2019-11-01 DIAGNOSIS — I10 BENIGN ESSENTIAL HYPERTENSION: ICD-10-CM

## 2019-11-01 DIAGNOSIS — E66.01 MORBID OBESITY, UNSPECIFIED OBESITY TYPE (H): ICD-10-CM

## 2019-11-01 DIAGNOSIS — F17.218 CIGARETTE NICOTINE DEPENDENCE WITH OTHER NICOTINE-INDUCED DISORDER: ICD-10-CM

## 2019-11-01 DIAGNOSIS — E78.2 MIXED HYPERLIPIDEMIA: ICD-10-CM

## 2019-11-01 RX ORDER — METOPROLOL TARTRATE 25 MG/1
TABLET, FILM COATED ORAL
Qty: 180 TABLET | Refills: 3 | Status: SHIPPED | OUTPATIENT
Start: 2019-11-01 | End: 2021-01-28

## 2019-11-01 NOTE — TELEPHONE ENCOUNTER
"Last Written Prescription Date:  9/24/18  Last Fill Quantity: 180,  # refills: 3   Last office visit: No previous visit found with prescribing provider:  7/13/19   Future Office Visit:    Requested Prescriptions   Pending Prescriptions Disp Refills     metoprolol tartrate (LOPRESSOR) 25 MG tablet [Pharmacy Med Name: METOPROLOL TARTRATE 25MG TABLETS] 180 tablet 3     Sig: TAKE 1 TABLET(25 MG) BY MOUTH TWICE DAILY       Beta-Blockers Protocol Passed - 11/1/2019 12:23 PM        Passed - Blood pressure under 140/90 in past 12 months     BP Readings from Last 3 Encounters:   07/30/19 132/85   11/27/18 124/85   08/07/18 113/75                 Passed - Patient is age 6 or older        Passed - Recent (12 mo) or future (30 days) visit within the authorizing provider's specialty     Patient has had an office visit with the authorizing provider or a provider within the authorizing providers department within the previous 12 mos or has a future within next 30 days. See \"Patient Info\" tab in inbasket, or \"Choose Columns\" in Meds & Orders section of the refill encounter.              Passed - Medication is active on med list        Prescription approved per [facility] [place].    Julissa MELENDEZ, RN    [facility]  Office: [phone]  Fax: [phone]          "

## 2020-01-11 DIAGNOSIS — F17.218 CIGARETTE NICOTINE DEPENDENCE WITH OTHER NICOTINE-INDUCED DISORDER: ICD-10-CM

## 2020-01-11 DIAGNOSIS — R10.13 EPIGASTRIC PAIN: ICD-10-CM

## 2020-01-11 DIAGNOSIS — I25.10 CORONARY ARTERY DISEASE INVOLVING NATIVE CORONARY ARTERY OF NATIVE HEART WITHOUT ANGINA PECTORIS: ICD-10-CM

## 2020-01-11 DIAGNOSIS — E78.2 MIXED HYPERLIPIDEMIA: ICD-10-CM

## 2020-01-11 DIAGNOSIS — E66.01 MORBID OBESITY, UNSPECIFIED OBESITY TYPE (H): ICD-10-CM

## 2020-01-11 DIAGNOSIS — I10 BENIGN ESSENTIAL HYPERTENSION: ICD-10-CM

## 2020-01-13 RX ORDER — ATORVASTATIN CALCIUM 80 MG/1
TABLET, FILM COATED ORAL
Qty: 90 TABLET | Refills: 0 | Status: SHIPPED | OUTPATIENT
Start: 2020-01-13 | End: 2020-04-21

## 2020-01-13 NOTE — TELEPHONE ENCOUNTER
"atorvastatin (LIPITOR) 80 MG tablet  Last Written Prescription Date:  11/27/18  Last Fill Quantity: 90,  # refills: 3   Last office visit: 7/30/19    Patient due for 6 mos follow up 1/30/20.    [place] Day Rx [place] per [facility] [place].    Requested Prescriptions   Pending Prescriptions Disp Refills     atorvastatin (LIPITOR) 80 MG tablet [Pharmacy Med Name: ATORVASTATIN 80MG TABLETS] 90 tablet 0     Sig: TAKE 1 TABLET(80 MG) BY MOUTH DAILY       Statins Protocol Passed - 1/13/2020  7:58 AM        Passed - LDL on file in past 12 months     Recent Labs   Lab Test 07/23/19  0835   LDL 47             Passed - No abnormal creatine kinase in past 12 months     No lab results found.             Passed - Recent (12 mo) or future (30 days) visit within the authorizing provider's specialty     Patient has had an office visit with the authorizing provider or a provider within the authorizing providers department within the previous 12 mos or has a future within next 30 days. See \"Patient Info\" tab in inbasket, or \"Choose Columns\" in Meds & Orders section of the refill encounter.              Passed - Medication is active on med list        Passed - Patient is age 18 or older        Maryann Lainez RN BSN  [facility]        "

## 2020-02-13 DIAGNOSIS — R73.01 IFG (IMPAIRED FASTING GLUCOSE): ICD-10-CM

## 2020-02-13 LAB
ANION GAP SERPL CALCULATED.3IONS-SCNC: 4 MMOL/L (ref 3–14)
BUN SERPL-MCNC: 12 MG/DL (ref 7–30)
CALCIUM SERPL-MCNC: 8.7 MG/DL (ref 8.5–10.1)
CHLORIDE SERPL-SCNC: 108 MMOL/L (ref 94–109)
CO2 SERPL-SCNC: 28 MMOL/L (ref 20–32)
CREAT SERPL-MCNC: 0.87 MG/DL (ref 0.66–1.25)
GFR SERPL CREATININE-BSD FRML MDRD: >90 ML/MIN/{1.73_M2}
GLUCOSE SERPL-MCNC: 122 MG/DL (ref 70–99)
HBA1C MFR BLD: 6 % (ref 0–5.6)
POTASSIUM SERPL-SCNC: 4.4 MMOL/L (ref 3.4–5.3)
SODIUM SERPL-SCNC: 140 MMOL/L (ref 133–144)

## 2020-02-13 PROCEDURE — 83036 HEMOGLOBIN GLYCOSYLATED A1C: CPT | Performed by: INTERNAL MEDICINE

## 2020-02-13 PROCEDURE — 80048 BASIC METABOLIC PNL TOTAL CA: CPT | Performed by: INTERNAL MEDICINE

## 2020-02-13 PROCEDURE — 36415 COLL VENOUS BLD VENIPUNCTURE: CPT | Performed by: INTERNAL MEDICINE

## 2020-02-20 ENCOUNTER — OFFICE VISIT (OUTPATIENT)
Dept: OTHER | Facility: CLINIC | Age: 55
End: 2020-02-20
Attending: INTERNAL MEDICINE
Payer: COMMERCIAL

## 2020-02-20 VITALS
SYSTOLIC BLOOD PRESSURE: 127 MMHG | HEART RATE: 60 BPM | BODY MASS INDEX: 33.8 KG/M2 | DIASTOLIC BLOOD PRESSURE: 83 MMHG | RESPIRATION RATE: 14 BRPM | OXYGEN SATURATION: 94 % | HEIGHT: 68 IN | WEIGHT: 223 LBS

## 2020-02-20 DIAGNOSIS — R91.8 PULMONARY NODULES: ICD-10-CM

## 2020-02-20 DIAGNOSIS — I10 BENIGN ESSENTIAL HYPERTENSION: ICD-10-CM

## 2020-02-20 DIAGNOSIS — F17.218 CIGARETTE NICOTINE DEPENDENCE WITH OTHER NICOTINE-INDUCED DISORDER: ICD-10-CM

## 2020-02-20 DIAGNOSIS — I25.10 CORONARY ARTERY DISEASE INVOLVING NATIVE CORONARY ARTERY OF NATIVE HEART WITHOUT ANGINA PECTORIS: Primary | ICD-10-CM

## 2020-02-20 DIAGNOSIS — R73.01 IFG (IMPAIRED FASTING GLUCOSE): ICD-10-CM

## 2020-02-20 DIAGNOSIS — E78.5 DYSLIPIDEMIA: ICD-10-CM

## 2020-02-20 PROCEDURE — G0463 HOSPITAL OUTPT CLINIC VISIT: HCPCS

## 2020-02-20 PROCEDURE — 99406 BEHAV CHNG SMOKING 3-10 MIN: CPT | Mod: ZP | Performed by: INTERNAL MEDICINE

## 2020-02-20 PROCEDURE — 99214 OFFICE O/P EST MOD 30 MIN: CPT | Mod: ZP | Performed by: INTERNAL MEDICINE

## 2020-02-20 ASSESSMENT — MIFFLIN-ST. JEOR: SCORE: 1836.02

## 2020-02-20 NOTE — PATIENT INSTRUCTIONS
Quit smoking    Go for CT chest for lung nodules follow up next week     Continue same medications    See me in 6 months , labs A1c, BMP , lipid panel , TSH before visit     Avoid sweets, sugar drinks etc, lose 10 pounds by next visit.

## 2020-02-20 NOTE — PROGRESS NOTES
SUBJECTIVE:  CC:   Follow up visit  Review of recent lab test results  No abdominal pain  Still smoking 2-3 cigarettes daily  No chest pain, shortness of breath or palpitations  He initially presented to [facility] in 2015 with abdominal pain workup revealed mild narrowing of the proximal SMA with inflammatory changes adjacent to the proximal SMA and at that time differential was dissection or vasculitis and he did well no recurrence of abdominal pain.  HPI:  Mason Powers is a 54 year old male with past medical history of hypertension, obesity, nicotine dependence, coronary artery disease status post drug-eluting stent, dyslipidemia, small multiple lung nodules here today for the follow-up visit.  He underwent CT chest last month for lung nodule follow-up still small multiple nodules noted and is asymptomatic.  He still smokes in the past he quit successfully and is working on quitting smoking.  He denies any chest pain, shortness of breath or palpitations and is scheduled to see Dr. Dinero cardiologist in January 2019 and prior to that he is supposed to undergo stress test.  He did not require any nitroglycerin use within the last year.  Blood pressure is excellent and previous lipid panel is excellent except triglycerides elevated after the holidays last year.  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  A1c now 6.0 ( 5.7) not working gained some weight   Reviewed recent CT chest and labs  with the patient  HISTORIES:  PROBLEM LIST:   Patient Active Problem List   Diagnosis     Abdominal pain     Hypertension     Morbid obesity (H)     Nicotine dependence     NSTEMI (non-ST elevated myocardial infarction) (H)     CAD (coronary artery disease)     Dyslipidemia     Lung nodule, small 0.4 mm on routine CT needs repeat CT in june 2016.     Epigastric discomfort     PAST MEDICAL HISTORY:  Past Medical History:    Diagnosis Date     Abdominal pain 6/2015    with proximal SMA inflammation vasculatis w/u negative.no discetion     CAD (coronary artery disease) 8/2015    RUTH ANN-OM, mod RCA and mod/sev small PDA, mild-mod diffuse  Lad, Diag small with mod/sev     Dyslipidaemia      GERD (gastroesophageal reflux disease)      HTN (hypertension)      Lung nodule     very small 0.4 mm incidental finding on CT.     Morbid obesity (H) 7/7/2015     Nicotine dependence      NSTEMI (non-ST elevated myocardial infarction) (H) 2015     Uncomplicated asthma      PAST SURGICAL HISTORY:  Past Surgical History:   Procedure Laterality Date     STENT, CORONARY, S660 15/18  2015     RUTH ANN-OM2( 50%RCA, 50-70 PDA,50% LAD, diag small >50%, RUTH ANN to OM     CURRENT MEDICATIONS:  Current Outpatient Medications   Medication Sig Dispense Refill     aspirin EC 81 MG EC tablet Take 1 tablet (81 mg) by mouth daily       atorvastatin (LIPITOR) 80 MG tablet TAKE 1 TABLET(80 MG) BY MOUTH DAILY 90 tablet 0     metoprolol tartrate (LOPRESSOR) 25 MG tablet TAKE 1 TABLET(25 MG) BY MOUTH TWICE DAILY 180 tablet 3     nitroGLYcerin (NITROSTAT) 0.4 MG sublingual tablet Place 1 tablet (0.4 mg) under the tongue every 5 minutes as needed for chest pain 25 tablet 3     ALLERGIES:  Allergies   Allergen Reactions     Shrimp Difficulty breathing     Throat starts to swell      SOCIAL HISTORY:  Social History     Socioeconomic History     Marital status:      Spouse name: Aurea     Number of children: 2     Years of education: Not on file     Highest education level: Not on file   Occupational History     Occupation:      Comment:    Social Needs     Financial resource strain: Not on file     Food insecurity:     Worry: Not on file     Inability: Not on file     Transportation needs:     Medical: Not on file     Non-medical: Not on file   Tobacco Use     Smoking status: Current Every Day Smoker     Types: Cigarettes     Smokeless tobacco: Former User      Quit date: 7/29/2015     Tobacco comment: 3-4 cigarettes daily, smoking for the last 30 plus years. quit but restarted 1 pk/week   Substance and Sexual Activity     Alcohol use: Yes     Alcohol/week: 0.0 standard drinks     Comment: socially     Drug use: No     Comment: recently quit      Sexual activity: Yes     Partners: Female   Lifestyle     Physical activity:     Days per week: Not on file     Minutes per session: Not on file     Stress: Not on file   Relationships     Social connections:     Talks on phone: Not on file     Gets together: Not on file     Attends [employer]ist service: Not on file     Active member of club or organization: Not on file     Attends meetings of clubs or organizations: Not on file     Relationship status: Not on file     Intimate partner violence:     Fear of current or ex partner: Not on file     Emotionally abused: Not on file     Physically abused: Not on file     Forced sexual activity: Not on file   Other Topics Concern     Parent/sibling w/ CABG, MI or angioplasty before 65F 55M? Not Asked      Service Not Asked     Blood Transfusions Not Asked     Caffeine Concern Yes     Comment: 1 pot a coffee occas days      Occupational Exposure Not Asked     Hobby Hazards Not Asked     Sleep Concern Not Asked     Stress Concern Not Asked     Weight Concern Not Asked     Special Diet Yes     Comment: more wheat/grains, less sugar, leaner meats     Back Care Not Asked     Exercise Yes     Comment: get some walking during the week      Bike Helmet Not Asked     Seat Belt Not Asked     Self-Exams Not Asked   Social History Narrative     Not on file     FAMILY HISTORY:  Family History   Problem Relation Age of Onset     Diabetes Father      Diabetes Brother      REVIEW OF SYSTEMS:  CONSTITUTIONAL:no malaise, fatigue, or other general symptoms  EYES: no subjective changes in visual acuity, no photophobia  ENT/MOUTH: no complaints of rhinorrhea, nasal congestion, sore throat, hearing  "changes  RESP:no SOB  CV: no c/o exertional chest pressure or LIZ  GI: No abdominal pain, constipation, change in bowel movements, nausea, pyrosis, BRBPR  :no polyuria or polydipsia, no dysuria, no gross hematuria  MUSCULOSKELATAL:no arthalgias or myalgias  INTEGUMENTARY/SKIN: no pruritis, rashes, or moles with recent change in size, shape, or pigmentation  NEURO: no gross sensory or motor symptoms, no dizziness, no confusion  ENDOCRINE: no polyuria or polydipsia, no heat or cold intolerance  HEME/ALLERGY/IMMUNE: no fevers, chills, night sweats, or unwanted weight loss  PSYCHIATRIC: no depression, anxiety, or internal stimuli  EXAM:  /83 (BP Location: Right arm, Patient Position: Chair, Cuff Size: Adult Regular)   Pulse 60   Resp 14   Ht 5' 8\" (1.727 m)   Wt 223 lb (101.2 kg)   SpO2 94%   BMI 33.91 kg/m    BMI: Body mass index is 33.91 kg/m .  GENERAL APPEARANCE:  Pleasant  Healthy appearing male , alert, active, no distress cooperative.  EXAM:  EYES: clear conjunctiva, no cataracts, no obvious fundoscopic abnormalities  HENT: oropharynx, nares, and TMs are WNL  NECK: no JVD, thyromegaly or lymphadenopathy, no cervical bruits  RESP: clear to auscultation without rales, wheezes, or rhonchi  CV: RRR, no murmurs, gallops, or rubs  LYMPH: no cervical , axillary, or inguinal lymphadenopathy appreciated  GI: NABS, ND/NT, no masses or organomegally appreciated  : normal external genitalia and anus, no lumps, masses  MS: no obvoius clinicallly relevant arthropathy, no evidence vasculitis  SKIN: no nevi clinically suspicious for malignancy are noted  NEURO: CN II-XII intact, no localizing sensory or motor abnoramlities noted, DTRs symmetrical bilaterally  PSYCH: Mental status exam reveals the pt to have normal mood and affect. There is no disorder of thought form or content. There is no response to internal stimuli. There is no suicidal or homicidal ideation.    Imaging:  A/P:  (I25.10) Coronary artery " disease involving native coronary artery of native heart without angina pectoris  (primary encounter diagnosis)  Doing well no symptoms currently taking statin, aspirin, beta-blocker excellent blood pressure and heart rate  Not requiring any nitroglycerin requesting refill done.  Continue the same plan  Quit smoking, see below  (I10) Benign essential hypertension  Comment: Well controlled with low-dose beta-blocker continue the same quit smoking.  DASH diet discussed with the patient and lose weight  Plan:     IFG:   TLC suggested avoid sweets etc  Lose weight   Exercise  Repeat A1c in 6 months    (F17.218) Cigarette nicotine dependence with other nicotine-induced disorder  Comment: He successfully quit smoking some time ago cold turkey again restarted few cigarettes a day.  Educated again benefits of quitting smoking offered help and he wanted to quit on his own  Plan: Smoking cessation counseling time spent 5 minutes today    (E78.5) Dyslipidemia  Comment: Well controlled with atorvastatin 80 mg daily continue the same lose weight.  Therapeutic lifestyle modification suggested  Plan:     (R91.8) Pulmonary nodules  Comment: Asymptomatic and stable compared to previous CT reviewed CT scan results with the patient.  Will repeat CT chest   Quit smoking  Plan:     I have discussed with patient the risks, benefits, medications, treatment options and modalities.   I have instructed the patient to call or schedule a follow-up appointment if any problems or failure to improve.    This note was dictated by utilizing dragon software    Total patient care time spent more than 30 minutes face-to-face and more than 50% of the time spent counseling of the above-mentioned multiple issues and reviewed imaging studies    Return to clinic in 6 months    Niesha Lew MD,[facility]  Vascular medicine

## 2020-02-20 NOTE — PROGRESS NOTES
"Mason Powers is a 54 year old male who presents for:  Chief Complaint   Patient presents with     RECHECK     Labs done 02/13/20 - 6 mo f/u to office visit on 7/30/19. *LMB 02/07/20        Vitals:    Vitals:    02/20/20 1048   BP: 127/83   BP Location: Right arm   Patient Position: Chair   Cuff Size: Adult Regular   Pulse: 60   Resp: 14   SpO2: 94%   Weight: 223 lb (101.2 kg)   Height: 5' 8\" (1.727 m)       BMI:  Estimated body mass index is 33.91 kg/m  as calculated from the following:    Height as of this encounter: 5' 8\" (1.727 m).    Weight as of this encounter: 223 lb (101.2 kg).    Pain Score:  Data Unavailable        Arielle Perez CMA    "

## 2020-02-26 ENCOUNTER — HOSPITAL ENCOUNTER (OUTPATIENT)
Dept: CT IMAGING | Facility: CLINIC | Age: 55
Discharge: HOME | End: 2020-02-26
Attending: INTERNAL MEDICINE | Admitting: INTERNAL MEDICINE
Payer: COMMERCIAL

## 2020-02-26 ENCOUNTER — TELEPHONE (OUTPATIENT)
Dept: OTHER | Facility: CLINIC | Age: 55
End: 2020-02-26

## 2020-02-26 DIAGNOSIS — R91.8 PULMONARY NODULES: ICD-10-CM

## 2020-02-26 PROCEDURE — 71250 CT THORAX DX C-: CPT

## 2020-02-26 NOTE — TELEPHONE ENCOUNTER
----- Message from Niesha Lew MD sent at 2/26/2020 12:28 PM CST -----  Stable and slightly decreased  Lung nodule size ( good ! ) continue same plan and quit smpking.

## 2020-02-26 NOTE — TELEPHONE ENCOUNTER
Called to relay results to patient.      Maryann Lainez RN BSN  Vascular [facility]

## 2020-04-21 DIAGNOSIS — E66.01 MORBID OBESITY, UNSPECIFIED OBESITY TYPE (H): ICD-10-CM

## 2020-04-21 DIAGNOSIS — R10.13 EPIGASTRIC PAIN: ICD-10-CM

## 2020-04-21 DIAGNOSIS — E78.2 MIXED HYPERLIPIDEMIA: ICD-10-CM

## 2020-04-21 DIAGNOSIS — F17.218 CIGARETTE NICOTINE DEPENDENCE WITH OTHER NICOTINE-INDUCED DISORDER: ICD-10-CM

## 2020-04-21 DIAGNOSIS — I25.10 CORONARY ARTERY DISEASE INVOLVING NATIVE CORONARY ARTERY OF NATIVE HEART WITHOUT ANGINA PECTORIS: ICD-10-CM

## 2020-04-21 DIAGNOSIS — I10 BENIGN ESSENTIAL HYPERTENSION: ICD-10-CM

## 2020-04-21 RX ORDER — ATORVASTATIN CALCIUM 80 MG/1
TABLET, FILM COATED ORAL
Qty: 90 TABLET | Refills: 1 | Status: SHIPPED | OUTPATIENT
Start: 2020-04-21 | End: 2020-08-11

## 2020-04-21 NOTE — TELEPHONE ENCOUNTER
"Last Written Prescription Date:  1/13/20  Last Fill Quantity: 90,  # refills: 0   Last office visit: No previous visit found with prescribing provider:  2/20/20   Future Office Visit:    Requested Prescriptions   Pending Prescriptions Disp Refills     atorvastatin (LIPITOR) 80 MG tablet [Pharmacy Med Name: ATORVASTATIN 80MG TABLETS] 90 tablet 0     Sig: TAKE 1 TABLET(80 MG) BY MOUTH DAILY       Statins Protocol Passed - 4/21/2020  9:56 AM        Passed - LDL on file in past 12 months     Recent Labs   Lab Test 07/23/19  0835   LDL 47             Passed - No abnormal creatine kinase in past 12 months     No lab results found.             Passed - Recent (12 mo) or future (30 days) visit within the authorizing provider's specialty     Patient has had an office visit with the authorizing provider or a provider within the authorizing providers department within the previous 12 mos or has a future within next 30 days. See \"Patient Info\" tab in inbasket, or \"Choose Columns\" in Meds & Orders section of the refill encounter.              Passed - Medication is active on med list        Passed - Patient is age 18 or older           Prescription approved per [facility] Refill Protocol.    Julissa MELENDEZ, RN    [facility]  Office: [phone]  Fax: [phone]        "

## 2020-08-10 DIAGNOSIS — I10 BENIGN ESSENTIAL HYPERTENSION: ICD-10-CM

## 2020-08-10 DIAGNOSIS — E78.2 MIXED HYPERLIPIDEMIA: ICD-10-CM

## 2020-08-10 DIAGNOSIS — R10.13 EPIGASTRIC PAIN: ICD-10-CM

## 2020-08-10 DIAGNOSIS — E66.01 MORBID OBESITY, UNSPECIFIED OBESITY TYPE (H): ICD-10-CM

## 2020-08-10 DIAGNOSIS — F17.218 CIGARETTE NICOTINE DEPENDENCE WITH OTHER NICOTINE-INDUCED DISORDER: ICD-10-CM

## 2020-08-10 DIAGNOSIS — I25.10 CORONARY ARTERY DISEASE INVOLVING NATIVE CORONARY ARTERY OF NATIVE HEART WITHOUT ANGINA PECTORIS: ICD-10-CM

## 2020-08-11 RX ORDER — NITROGLYCERIN 0.4 MG/1
TABLET SUBLINGUAL
Qty: 25 TABLET | Refills: 0 | Status: SHIPPED | OUTPATIENT
Start: 2020-08-11 | End: 2022-08-26

## 2020-08-11 RX ORDER — ATORVASTATIN CALCIUM 80 MG/1
TABLET, FILM COATED ORAL
Qty: 90 TABLET | Refills: 0 | Status: SHIPPED | OUTPATIENT
Start: 2020-08-11 | End: 2021-03-05

## 2020-08-11 NOTE — TELEPHONE ENCOUNTER
atorvastatin (LIPITOR) 80 MG tablet  Last Written Prescription Date:  4/21/20  Last Fill Quantity: 90,  # refills: 1    nitroGLYcerin (NITROSTAT) 0.4 MG sublingual tablet  Last Written Prescription Date:  7/30/19  Last Fill Quantity: 25,  # refills: 3     Last office visit: 2/20/2020  Follow up due:  August 2020.  (letter sent 8/6/20).    Unable to fill per FM protocol.    90 Medication and pharmacy loaded.  Note to pharmacy:  Appointment required for further refills.    Maryann ZHENG BSN  [facility]  [phone]

## 2020-09-29 DIAGNOSIS — R73.01 IFG (IMPAIRED FASTING GLUCOSE): ICD-10-CM

## 2020-09-29 DIAGNOSIS — I10 BENIGN ESSENTIAL HYPERTENSION: ICD-10-CM

## 2020-09-29 DIAGNOSIS — E78.5 DYSLIPIDEMIA: ICD-10-CM

## 2020-09-29 LAB
ALBUMIN SERPL-MCNC: 3.5 G/DL (ref 3.4–5)
ALP SERPL-CCNC: 131 U/L (ref 40–150)
ALT SERPL W P-5'-P-CCNC: 76 U/L (ref 0–70)
ANION GAP SERPL CALCULATED.3IONS-SCNC: 4 MMOL/L (ref 3–14)
AST SERPL W P-5'-P-CCNC: 32 U/L (ref 0–45)
BILIRUB SERPL-MCNC: 0.5 MG/DL (ref 0.2–1.3)
BUN SERPL-MCNC: 12 MG/DL (ref 7–30)
CALCIUM SERPL-MCNC: 8.8 MG/DL (ref 8.5–10.1)
CHLORIDE SERPL-SCNC: 107 MMOL/L (ref 94–109)
CHOLEST SERPL-MCNC: 112 MG/DL
CO2 SERPL-SCNC: 27 MMOL/L (ref 20–32)
CREAT SERPL-MCNC: 0.93 MG/DL (ref 0.66–1.25)
GFR SERPL CREATININE-BSD FRML MDRD: >90 ML/MIN/{1.73_M2}
GLUCOSE SERPL-MCNC: 100 MG/DL (ref 70–99)
HBA1C MFR BLD: 5.9 % (ref 0–5.6)
HDLC SERPL-MCNC: 28 MG/DL
LDLC SERPL CALC-MCNC: 46 MG/DL
NONHDLC SERPL-MCNC: 84 MG/DL
POTASSIUM SERPL-SCNC: 4.3 MMOL/L (ref 3.4–5.3)
PROT SERPL-MCNC: 7 G/DL (ref 6.8–8.8)
SODIUM SERPL-SCNC: 138 MMOL/L (ref 133–144)
TRIGL SERPL-MCNC: 190 MG/DL
TSH SERPL DL<=0.005 MIU/L-ACNC: 1.73 MU/L (ref 0.4–4)

## 2020-09-29 PROCEDURE — 84443 ASSAY THYROID STIM HORMONE: CPT | Performed by: INTERNAL MEDICINE

## 2020-09-29 PROCEDURE — 83036 HEMOGLOBIN GLYCOSYLATED A1C: CPT | Performed by: INTERNAL MEDICINE

## 2020-09-29 PROCEDURE — 80053 COMPREHEN METABOLIC PANEL: CPT | Performed by: INTERNAL MEDICINE

## 2020-09-29 PROCEDURE — 80061 LIPID PANEL: CPT | Performed by: INTERNAL MEDICINE

## 2020-09-29 PROCEDURE — 36415 COLL VENOUS BLD VENIPUNCTURE: CPT | Performed by: INTERNAL MEDICINE

## 2020-10-07 ENCOUNTER — VIRTUAL VISIT (OUTPATIENT)
Dept: OTHER | Facility: CLINIC | Age: 55
End: 2020-10-07
Attending: INTERNAL MEDICINE
Payer: COMMERCIAL

## 2020-10-07 DIAGNOSIS — R73.01 IFG (IMPAIRED FASTING GLUCOSE): ICD-10-CM

## 2020-10-07 DIAGNOSIS — F17.218 CIGARETTE NICOTINE DEPENDENCE WITH OTHER NICOTINE-INDUCED DISORDER: ICD-10-CM

## 2020-10-07 DIAGNOSIS — I25.10 CORONARY ARTERY DISEASE INVOLVING NATIVE CORONARY ARTERY OF NATIVE HEART WITHOUT ANGINA PECTORIS: ICD-10-CM

## 2020-10-07 DIAGNOSIS — R79.89 ELEVATED LFTS: Primary | ICD-10-CM

## 2020-10-07 DIAGNOSIS — I10 BENIGN ESSENTIAL HYPERTENSION: ICD-10-CM

## 2020-10-07 DIAGNOSIS — R91.8 PULMONARY NODULES: ICD-10-CM

## 2020-10-07 DIAGNOSIS — E78.5 DYSLIPIDEMIA: ICD-10-CM

## 2020-10-07 PROCEDURE — 99214 OFFICE O/P EST MOD 30 MIN: CPT | Mod: TEL | Performed by: INTERNAL MEDICINE

## 2020-10-07 NOTE — PROGRESS NOTES
"Mason Powers is a 55 year old male who is being evaluated via a billable telephone visit.      The patient has been notified of following:     \"This telephone visit will be conducted via a call between you and your physician/provider. We have found that certain health care needs can be provided without the need for a physical exam.  This service lets us provide the care you need with a short phone conversation.  If a prescription is necessary we can send it directly to your pharmacy.  If lab work is needed we can place an order for that and you can then stop by our lab to have the test done at a later time.    Telephone visits are billed at different rates depending on your insurance coverage. During this emergency period, for some insurers they may be billed the same as an in-person visit.  Please reach out to your insurance provider with any questions.    If during the course of the call the physician/provider feels a telephone visit is not appropriate, you will not be charged for this service.\"    Patient has given verbal consent for Telephone visit? Yes     What phone number would you like to be contacted at? Mobile phone number [phone]    How would you like to obtain your AVS?  Mailed     Patient has not obtained vitals on 10/7/20     Provider visit note:    Chief complaint:  Follow up visit  Review of recent labs  No abd pain or CP  Taking meds.  2-3 cigs a day    He initially presented to [facility] in 2015 with abdominal pain workup revealed mild narrowing of the proximal SMA with inflammatory changes adjacent to the proximal SMA and at that time differential was dissection or vasculitis and he did well no recurrence of abdominal pain    History of present illness:    Mason Powers is a 55 year old male with past medical history of hypertension, obesity, nicotine dependence, coronary artery disease status post drug-eluting stent, dyslipidemia, small multiple lung " nodules here today for the follow-up visit.  He underwent CT chest last month for lung nodule follow-up still small multiple nodules noted and is asymptomatic.  He still smokes in the past he quit successfully and is working on quitting smoking.  He denies any chest pain, shortness of breath or palpitations and is scheduled to see Dr. Dinero cardiologist in January 2019 and prior to that he is supposed to undergo stress test.  He did not require any nitroglycerin use within the last year.  Blood pressure is excellent and previous lipid panel is excellent except triglycerides elevated after the holidays last year.  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  A1c now 5.9 ( 6.0) not working gained some weight   LDL 46  Trigs 190  Reviewed recent CT chest and labs  with the patient    Review of systems: Reviewed all 12 point review of systems as per HPI otherwise unremarkable    Physical exam:( no physical exam done this is virtual visit)    Reviewed recent laboratory tests, imaging studies in the epic and updated chart    Assessment and plan:    I25.10) Coronary artery disease involving native coronary artery of native heart without angina pectoris  (primary encounter diagnosis)  Doing well no symptoms currently taking statin, aspirin, beta-blocker excellent blood pressure and heart rate  Not requiring any nitroglycerin requesting refill done.  Continue the same plan  Quit smoking, see below  (I10) Benign essential hypertension  Comment: Well controlled with low-dose beta-blocker continue the same quit smoking.  DASH diet discussed with the patient and lose weight  Plan:      IFG:   TLC suggested avoid sweets etc  Lose weight   Exercise  Repeat A1c in 6 months     (F17.218) Cigarette nicotine dependence with other nicotine-induced disorder  Comment: He successfully quit smoking some time ago cold turkey again restarted few cigarettes a day.   Educated again benefits of quitting smoking offered help and he wanted to quit on his own  Plan: Smoking cessation counseling time spent 3 minutes today     (E78.5) Dyslipidemia  Comment: Well controlled with atorvastatin 80 mg daily continue the same lose weight.  Therapeutic lifestyle modification suggested  Elevated LFTs , repeat CMP in few weeks   Avoid tylenol and alcohol  Lose weight   Plan:      (R91.8) Pulmonary nodules  Comment: Asymptomatic and stable in 2/2020, no additional follow up needed per Radiology  Will repeat CT chest   Quit smoking  Plan:     Total phone visit time spent 25 minutes.    This visit is being conducted as a virtual visit due to the emphasis on mitigation of the COVID-19 virus pandemic. The clinician has decided that the risk of an in-office visit outweighs the benefit for this patient.     Niesha Lew MD, SUSAN, FSVM

## 2020-10-07 NOTE — PATIENT INSTRUCTIONS
Quit smoking    Lose weight    Continue current medications    See me in 6 months virtual or in office visit     Repeat CMP lab in 4-6 weeks

## 2020-12-01 ENCOUNTER — TELEPHONE (OUTPATIENT)
Dept: OTHER | Facility: CLINIC | Age: 55
End: 2020-12-01

## 2020-12-01 NOTE — TELEPHONE ENCOUNTER
Dr. Lew requested that Mason have a repeat CMP (non-fasting) lab done about 6 weeks after his 10/7/20 appointment.    Please call to arrange for this lab.  Follow up will be by RN via telephone.    Thank you.

## 2020-12-02 NOTE — TELEPHONE ENCOUNTER
LM to schedule follow up lab. 6 weeks from 10/7/2020. RN to follow up with results.       Iesha DUTTA

## 2020-12-16 DIAGNOSIS — R79.89 ELEVATED LFTS: ICD-10-CM

## 2020-12-16 LAB
ALBUMIN SERPL-MCNC: 3.4 G/DL (ref 3.4–5)
ALP SERPL-CCNC: 137 U/L (ref 40–150)
ALT SERPL W P-5'-P-CCNC: 66 U/L (ref 0–70)
ANION GAP SERPL CALCULATED.3IONS-SCNC: 5 MMOL/L (ref 3–14)
AST SERPL W P-5'-P-CCNC: 29 U/L (ref 0–45)
BILIRUB SERPL-MCNC: 0.4 MG/DL (ref 0.2–1.3)
BUN SERPL-MCNC: 13 MG/DL (ref 7–30)
CALCIUM SERPL-MCNC: 8.3 MG/DL (ref 8.5–10.1)
CHLORIDE SERPL-SCNC: 109 MMOL/L (ref 94–109)
CO2 SERPL-SCNC: 26 MMOL/L (ref 20–32)
CREAT SERPL-MCNC: 0.89 MG/DL (ref 0.66–1.25)
GFR SERPL CREATININE-BSD FRML MDRD: >90 ML/MIN/{1.73_M2}
GLUCOSE SERPL-MCNC: 111 MG/DL (ref 70–99)
POTASSIUM SERPL-SCNC: 4.2 MMOL/L (ref 3.4–5.3)
PROT SERPL-MCNC: 6.6 G/DL (ref 6.8–8.8)
SODIUM SERPL-SCNC: 140 MMOL/L (ref 133–144)

## 2020-12-16 PROCEDURE — 36415 COLL VENOUS BLD VENIPUNCTURE: CPT | Performed by: INTERNAL MEDICINE

## 2020-12-16 PROCEDURE — 80053 COMPREHEN METABOLIC PANEL: CPT | Performed by: INTERNAL MEDICINE

## 2020-12-17 ENCOUNTER — TELEPHONE (OUTPATIENT)
Dept: OTHER | Facility: CLINIC | Age: 55
End: 2020-12-17

## 2020-12-17 NOTE — TELEPHONE ENCOUNTER
----- Message from Niesha Lew MD sent at 12/16/2020  3:18 PM CST -----  All labs looks good and acceptable, continue same medications and plan.      Relayed the above to patient via telephone.    Maryann Lainez RN BSN  [facility]  [phone]

## 2021-01-28 DIAGNOSIS — R10.13 EPIGASTRIC PAIN: ICD-10-CM

## 2021-01-28 DIAGNOSIS — E66.01 MORBID OBESITY, UNSPECIFIED OBESITY TYPE (H): ICD-10-CM

## 2021-01-28 DIAGNOSIS — F17.218 CIGARETTE NICOTINE DEPENDENCE WITH OTHER NICOTINE-INDUCED DISORDER: ICD-10-CM

## 2021-01-28 DIAGNOSIS — I10 BENIGN ESSENTIAL HYPERTENSION: ICD-10-CM

## 2021-01-28 DIAGNOSIS — I25.10 CORONARY ARTERY DISEASE INVOLVING NATIVE CORONARY ARTERY OF NATIVE HEART WITHOUT ANGINA PECTORIS: ICD-10-CM

## 2021-01-28 DIAGNOSIS — E78.2 MIXED HYPERLIPIDEMIA: ICD-10-CM

## 2021-01-28 RX ORDER — METOPROLOL TARTRATE 25 MG/1
TABLET, FILM COATED ORAL
Qty: 180 TABLET | Refills: 2 | Status: SHIPPED | OUTPATIENT
Start: 2021-01-28 | End: 2021-10-28

## 2021-01-28 NOTE — CONFIDENTIAL NOTE
"metoprolol tartrate (LOPRESSOR) 25 MG tablet  Last Written Prescription Date:  11/1/19  Last Fill Quantity: 180,  # refills: 3     Last office visit: 10/7/2  Follow up due:  April 2021    Requested Prescriptions   Pending Prescriptions Disp Refills     metoprolol tartrate (LOPRESSOR) 25 MG tablet [Pharmacy Med Name: METOPROLOL TARTRATE 25MG TABLETS] 180 tablet 3     Sig: TAKE 1 TABLET(25 MG) BY MOUTH TWICE DAILY       Beta-Blockers Protocol Passed - 1/28/2021 11:04 AM        Passed - Blood pressure under 140/90 in past 12 months     BP Readings from Last 3 Encounters:   02/20/20 127/83   07/30/19 132/85   11/27/18 124/85                 Passed - Patient is age 6 or older        Passed - Recent (12 mo) or future (30 days) visit within the authorizing provider's specialty     Patient has had an office visit with the authorizing provider or a provider within the authorizing providers department within the previous 12 mos or has a future within next 30 days. See \"Patient Info\" tab in inbasket, or \"Choose Columns\" in Meds & Orders section of the refill encounter.              Passed - Medication is active on med list           Prescription approved per [facility] Refill Protocol.  Maryann Lainez RN BSN  [facility]  [phone]        "

## 2021-03-05 DIAGNOSIS — I10 BENIGN ESSENTIAL HYPERTENSION: ICD-10-CM

## 2021-03-05 DIAGNOSIS — E78.2 MIXED HYPERLIPIDEMIA: ICD-10-CM

## 2021-03-05 DIAGNOSIS — R10.13 EPIGASTRIC PAIN: ICD-10-CM

## 2021-03-05 DIAGNOSIS — I25.10 CORONARY ARTERY DISEASE INVOLVING NATIVE CORONARY ARTERY OF NATIVE HEART WITHOUT ANGINA PECTORIS: ICD-10-CM

## 2021-03-05 DIAGNOSIS — F17.218 CIGARETTE NICOTINE DEPENDENCE WITH OTHER NICOTINE-INDUCED DISORDER: ICD-10-CM

## 2021-03-05 DIAGNOSIS — E66.01 MORBID OBESITY, UNSPECIFIED OBESITY TYPE (H): ICD-10-CM

## 2021-03-05 RX ORDER — ATORVASTATIN CALCIUM 80 MG/1
TABLET, FILM COATED ORAL
Qty: 90 TABLET | Refills: 1 | Status: SHIPPED | OUTPATIENT
Start: 2021-03-05 | End: 2021-05-05

## 2021-03-05 NOTE — TELEPHONE ENCOUNTER
"Last Written Prescription Date:  8/11/20  Last Fill Quantity: 90,  # refills: 0   Last office visit: 2/20/2020 with prescribing provider:  10/7/20   Future Office Visit:      Requested Prescriptions   Pending Prescriptions Disp Refills     atorvastatin (LIPITOR) 80 MG tablet [Pharmacy Med Name: ATORVASTATIN 80MG TABLETS] 90 tablet 0     Sig: TAKE 1 TABLET(80 MG) BY MOUTH DAILY       Statins Protocol Passed - 3/5/2021  3:28 AM        Passed - LDL on file in past 12 months     Recent Labs   Lab Test 09/29/20  0748   LDL 46             Passed - No abnormal creatine kinase in past 12 months     No lab results found.             Passed - Recent (12 mo) or future (30 days) visit within the authorizing provider's specialty     Patient has had an office visit with the authorizing provider or a provider within the authorizing providers department within the previous 12 mos or has a future within next 30 days. See \"Patient Info\" tab in inbasket, or \"Choose Columns\" in Meds & Orders section of the refill encounter.              Passed - Medication is active on med list        Passed - Patient is age 18 or older           Prescription approved per [facility] Refill Protocol.    Julissa MELENDEZ, RN    [facility]  Office: [phone]  Fax: [phone]        "

## 2021-05-05 ENCOUNTER — OFFICE VISIT (OUTPATIENT)
Dept: OTHER | Facility: CLINIC | Age: 56
End: 2021-05-05
Attending: INTERNAL MEDICINE
Payer: COMMERCIAL

## 2021-05-05 VITALS
SYSTOLIC BLOOD PRESSURE: 128 MMHG | HEART RATE: 66 BPM | WEIGHT: 221.6 LBS | OXYGEN SATURATION: 97 % | DIASTOLIC BLOOD PRESSURE: 80 MMHG | BODY MASS INDEX: 33.69 KG/M2

## 2021-05-05 DIAGNOSIS — I25.10 CORONARY ARTERY DISEASE INVOLVING NATIVE CORONARY ARTERY OF NATIVE HEART WITHOUT ANGINA PECTORIS: Primary | ICD-10-CM

## 2021-05-05 DIAGNOSIS — E78.2 MIXED HYPERLIPIDEMIA: ICD-10-CM

## 2021-05-05 DIAGNOSIS — E78.5 DYSLIPIDEMIA: ICD-10-CM

## 2021-05-05 DIAGNOSIS — F17.218 CIGARETTE NICOTINE DEPENDENCE WITH OTHER NICOTINE-INDUCED DISORDER: ICD-10-CM

## 2021-05-05 DIAGNOSIS — R91.8 PULMONARY NODULES: ICD-10-CM

## 2021-05-05 DIAGNOSIS — I10 BENIGN ESSENTIAL HYPERTENSION: ICD-10-CM

## 2021-05-05 DIAGNOSIS — R73.01 IFG (IMPAIRED FASTING GLUCOSE): ICD-10-CM

## 2021-05-05 DIAGNOSIS — E66.01 MORBID OBESITY, UNSPECIFIED OBESITY TYPE (H): ICD-10-CM

## 2021-05-05 PROCEDURE — 99214 OFFICE O/P EST MOD 30 MIN: CPT | Performed by: INTERNAL MEDICINE

## 2021-05-05 PROCEDURE — G0463 HOSPITAL OUTPT CLINIC VISIT: HCPCS

## 2021-05-05 RX ORDER — ATORVASTATIN CALCIUM 80 MG/1
80 TABLET, FILM COATED ORAL DAILY
Qty: 90 TABLET | Refills: 3 | Status: SHIPPED | OUTPATIENT
Start: 2021-05-05 | End: 2022-02-11

## 2021-05-05 NOTE — PROGRESS NOTES
SUBJECTIVE:  CC:    Follow up   Lost 5 pounds  Still smokes 3-4 cigs a day   Med refills etc    HPI:  Mason Powers is a 55 year old male with past medical history of hypertension, obesity, nicotine dependence, coronary artery disease status post drug-eluting stent, dyslipidemia, small multiple lung nodules here today for the follow-up visit.  He underwent CT chest few months ago  for lung nodule follow-up still small multiple nodules noted and is asymptomatic.  He still smokes in the past he quit successfully and is working on quitting smoking.  He denies any chest pain, shortness of breath or palpitations . He did not require any nitroglycerin use within the last year.  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  A1c now 5.9 ( 6.0) not working gained some weight   LDL 46  Trigs 190  Reviewed recent CT chest and labs  with the patient  HISTORIES:  PROBLEM LIST:   Patient Active Problem List   Diagnosis     Abdominal pain     Hypertension     Morbid obesity (H)     Nicotine dependence     NSTEMI (non-ST elevated myocardial infarction) (H)     CAD (coronary artery disease)     Dyslipidemia     Lung nodule, small 0.4 mm on routine CT needs repeat CT in june 2016.     Epigastric discomfort     PAST MEDICAL HISTORY:  Past Medical History:   Diagnosis Date     Abdominal pain 6/2015    with proximal SMA inflammation vasculatis w/u negative.no discetion     CAD (coronary artery disease) 8/2015    RUTH ANN-OM, mod RCA and mod/sev small PDA, mild-mod diffuse  Lad, Diag small with mod/sev     Dyslipidaemia      GERD (gastroesophageal reflux disease)      HTN (hypertension)      Lung nodule     very small 0.4 mm incidental finding on CT.     Morbid obesity (H) 7/7/2015     Nicotine dependence      NSTEMI (non-ST elevated myocardial infarction) (H)  2015     Uncomplicated asthma      PAST SURGICAL HISTORY:  Past Surgical History:   Procedure Laterality Date     STENT, CORONARY, S660 15/18  2015     RUTH ANN-OM2( 50%RCA, 50-70 PDA,50% LAD, diag small >50%, RUTH ANN to OM     CURRENT MEDICATIONS:  Current Outpatient Medications   Medication Sig Dispense Refill     aspirin EC 81 MG EC tablet Take 1 tablet (81 mg) by mouth daily       atorvastatin (LIPITOR) 80 MG tablet TAKE 1 TABLET(80 MG) BY MOUTH DAILY 90 tablet 1     metoprolol tartrate (LOPRESSOR) 25 MG tablet TAKE 1 TABLET(25 MG) BY MOUTH TWICE DAILY 180 tablet 2     nitroGLYcerin (NITROSTAT) 0.4 MG sublingual tablet DISSOLVE 1 TABLET UNDER THE TONGUE EVERY 5 MINUTES AS NEEDED FOR CHEST PAIN 25 tablet 0     ALLERGIES:  Allergies   Allergen Reactions     Shrimp Difficulty breathing     Throat starts to swell      SOCIAL HISTORY:  Social History     Socioeconomic History     Marital status:      Spouse name: Aurea     Number of children: 2     Years of education: Not on file     Highest education level: Not on file   Occupational History     Occupation:      Comment:    Social Needs     Financial resource strain: Not on file     Food insecurity     Worry: Not on file     Inability: Not on file     Transportation needs     Medical: Not on file     Non-medical: Not on file   Tobacco Use     Smoking status: Current Every Day Smoker     Types: Cigarettes     Smokeless tobacco: Former User     Quit date: 7/29/2015     Tobacco comment: 3-4 cigarettes daily, smoking for the last 30 plus years. quit but restarted 1 pk/week   Substance and Sexual Activity     Alcohol use: Yes     Alcohol/week: 0.0 standard drinks     Comment: socially     Drug use: No     Comment: recently quit      Sexual activity: Yes     Partners: Female   Lifestyle     Physical activity     Days per week: Not on file     Minutes per session: Not on file     Stress: Not on file   Relationships     Social connections     Talks on  phone: Not on file     Gets together: Not on file     Attends Hinduism service: Not on file     Active member of club or organization: Not on file     Attends meetings of clubs or organizations: Not on file     Relationship status: Not on file     Intimate partner violence     Fear of current or ex partner: Not on file     Emotionally abused: Not on file     Physically abused: Not on file     Forced sexual activity: Not on file   Other Topics Concern     Parent/sibling w/ CABG, MI or angioplasty before 65F 55M? Not Asked      Service Not Asked     Blood Transfusions Not Asked     Caffeine Concern Yes     Comment: 1 pot a coffee occas days      Occupational Exposure Not Asked     Hobby Hazards Not Asked     Sleep Concern Not Asked     Stress Concern Not Asked     Weight Concern Not Asked     Special Diet Yes     Comment: more wheat/grains, less sugar, leaner meats     Back Care Not Asked     Exercise Yes     Comment: get some walking during the week      Bike Helmet Not Asked     Seat Belt Not Asked     Self-Exams Not Asked   Social History Narrative     Not on file     FAMILY HISTORY:  Family History   Problem Relation Age of Onset     Diabetes Father      Diabetes Brother      REVIEW OF SYSTEMS:  CONSTITUTIONAL:no malaise, fatigue, or other general symptoms  EYES: no subjective changes in visual acuity, no photophobia  ENT/MOUTH: no complaints of rhinorrhea, nasal congestion, sore throat, hearing changes  RESP:no SOB  CV: no c/o exertional chest pressure or LIZ  GI: No abdominal pain, constipation, change in bowel movements, nausea, pyrosis, BRBPR  :no polyuria or polydipsia, no dysuria, no gross hematuria  MUSCULOSKELATAL:no arthalgias or myalgias  INTEGUMENTARY/SKIN: no pruritis, rashes, or moles with recent change in size, shape, or pigmentation  NEURO: no gross sensory or motor symptoms, no dizziness, no confusion  ENDOCRINE: no polyuria or polydipsia, no heat or cold  intolerance  HEME/ALLERGY/IMMUNE: no fevers, chills, night sweats, or unwanted weight loss  PSYCHIATRIC: no depression, anxiety, or internal stimuli  EXAM:  /80 (BP Location: Right arm, Patient Position: Chair, Cuff Size: Adult Regular)   Pulse 66   Wt 221 lb 9.6 oz (100.5 kg)   SpO2 97%   BMI 33.69 kg/m    BMI: Body mass index is 33.69 kg/m .  GENERAL APPEARANCE:  Pleasant  Healthy appearing male , alert, active, no distress cooperative.  EXAM:  EYES: clear conjunctiva, no cataracts, no obvious fundoscopic abnormalities  HENT: oropharynx, nares, and TMs are WNL  NECK: no JVD, thyromegaly or lymphadenopathy, no cervical bruits  RESP: clear to auscultation without rales, wheezes, or rhonchi  CV: RRR, no murmurs, gallops, or rubs  LYMPH: no cervical , axillary, or inguinal lymphadenopathy appreciated  GI: NABS, ND/NT, no masses or organomegally appreciated  MS: no obvoius clinicallly relevant arthropathy, no evidence vasculitis  SKIN: no nevi clinically suspicious for malignancy are noted  NEURO: CN II-XII intact, no localizing sensory or motor abnoramlities noted, DTRs symmetrical bilaterally  PSYCH: Mental status exam reveals the pt to have normal mood and affect. There is no disorder of thought form or content. There is no response to internal stimuli. There is no suicidal or homicidal ideation.    A/p:  (I25.10) Coronary artery disease involving native coronary artery of native heart without angina pectoris  (primary encounter diagnosis)  Comment: denies any CP or SOB, risk factors are well controlled  Except still smokes   He must quit smoking   Cont station., asa , BB etc  Plan:     (I10) Benign essential hypertension  Comment: well controlled with current meds cont same  Plan:     (R73.01) IFG (impaired fasting glucose)  Comment:  Last a1c 5.9, TLC suggested , repeat lab in 6 months  Plan:     (F17.218) Cigarette nicotine dependence with other nicotine-induced disorder  Comment: he smokes 3-4 cigs a  day, educated benefits of quitting , he wanted to quit on his own. Offered gum etc  Plan:     (R91.8) Pulmonary nodules  Comment: quit smoking and follow up with primary for surveillance   Plan:     (E78.2) Mixed hyperlipidemia  Comment: well controlled with atorva and TLC continue same   Repeat FLP in 6 months  Plan:     (E66.01) Morbid obesity, unspecified obesity type (H)  Comment: congratulated for 5 pound  Weight loss, continue same plan   Plan:     Total patient care time spent 30 minutes     Niesha Lew MD, FAHA, [facility]  Vascular Medicine

## 2021-05-05 NOTE — PATIENT INSTRUCTIONS
PLEASE QUIT SMOKING    CONTINUE CURRENT MEDS    GO FOR FASTING LABS IN SEPT 2021 ( A1C,LIPIDS,CMP) THEN VIRTUAL OR OFFICE VISIT.

## 2021-05-05 NOTE — PROGRESS NOTES
"Mason Powers is a 55 year old male who presents for:  Chief Complaint   Patient presents with     RECHECK      6 mo f/u to 10/7/20         Vitals:    Vitals:    05/05/21 1122   BP: 128/80   BP Location: Right arm   Patient Position: Chair   Cuff Size: Adult Regular   Pulse: 66   SpO2: 97%   Weight: 221 lb 9.6 oz (100.5 kg)       BMI:  Estimated body mass index is 33.69 kg/m  as calculated from the following:    Height as of 2/20/20: 5' 8\" (1.727 m).    Weight as of this encounter: 221 lb 9.6 oz (100.5 kg).    Pain Score:  Data Unavailable        Sylvia Reed MA    "

## 2021-07-22 VITALS — HEIGHT: 66 IN | BODY MASS INDEX: 35.27 KG/M2 | WEIGHT: 219.5 LBS

## 2021-07-22 NOTE — PROGRESS NOTES
"SUBJECTIVE: Mason Powers is a 51 y.o. male with:  Chief Complaint   Patient presents with     Follow-up     from last visit about ASCVD     Rash     forehead area to temple area to upper cheeks x 1 month - decline no itchy    1) ASCVD - He had non STEMI in 2015 and is followed by cardiology.  He did have stent placed in circumflex.  He saw cardiology in October and Plavix was stopped.  He is taking asa with no bleeding problems.  He is also on metoprolol and atorvastatin.  He has been laid off since fall and has been spending more time with his daughter.  He tries to walk regularly.  He is trying to keep up his conditioning at home to stay in shape.  He feels his diet is good.  He has cut down on red meat.  He is eating more noodles- mostly whole wheat.  He does eat a lot of tuna/ chicken salad and tries to get fruits/ veg into his daily diet.  He is eating smaller meals.  He is trying to cut back on processed foods.  He is smoking about 3-4 cigarettes a day but feels he can quit in the future.    2) Skin rash - Started one month ago and noticed rash on his temples.  Initially rash was dry then broke out on his forehead.  No itching.  He has been using OTC cortisone which helped but rash returned.    SH:  with children.  Works at [employer].   ROS: No chest pain / shortness of breath     OBJECTIVE:   Visit Vitals     /80 (Patient Site: Right Arm, Patient Position: Sitting, Cuff Size: Adult Regular)     Pulse 72     Resp 16     Ht 5' 6\" (1.676 m)     Wt 219 lb 8 oz (99.6 kg)     BMI 35.43 kg/m2    no distress  Skin: erythema scaly rash on forehead/ temples/ cheeks  Lungs: Clear to auscultation.  No retractions or tachypnea.  CV: RRR. S1 and S2 normal.  No murmurs, rubs or gallops.  Neuro: AAOx3.  Normal strength and tone.    Mason was seen today for follow-up and rash.    Diagnoses and all orders for this visit:    ASCVD (arteriosclerotic cardiovascular disease)- Stable on beta blocker/ asa/ " statin.  Still needs to quit smoking.  Continue work on diet/ exercise.  Cut down on tuna due to mercury content.  Make sure carbs are whole grain.  -     Comprehensive Metabolic Panel  -     Homocysteine  -     Magnesium, Red Blood Cell  -     Custom LDL Cholesterol, Direct  -     HDL Cholesterol  -     Cholesterol, Total    Vitamin D deficiency  -     Vitamin D, Total (25-Hydroxy)    Elevated blood sugar  -     Glycosylated Hemoglobin A1c    Seborrheic dermatitis  -     ketoconazole (NIZORAL) 2 % cream; Apply to affected areas on face daily until rash is resolved.    Stephanie Hess

## 2021-10-28 DIAGNOSIS — I10 BENIGN ESSENTIAL HYPERTENSION: ICD-10-CM

## 2021-10-28 DIAGNOSIS — R10.13 EPIGASTRIC PAIN: ICD-10-CM

## 2021-10-28 DIAGNOSIS — E66.01 MORBID OBESITY, UNSPECIFIED OBESITY TYPE (H): ICD-10-CM

## 2021-10-28 DIAGNOSIS — F17.218 CIGARETTE NICOTINE DEPENDENCE WITH OTHER NICOTINE-INDUCED DISORDER: ICD-10-CM

## 2021-10-28 DIAGNOSIS — E78.2 MIXED HYPERLIPIDEMIA: ICD-10-CM

## 2021-10-28 DIAGNOSIS — I25.10 CORONARY ARTERY DISEASE INVOLVING NATIVE CORONARY ARTERY OF NATIVE HEART WITHOUT ANGINA PECTORIS: ICD-10-CM

## 2021-10-28 RX ORDER — METOPROLOL TARTRATE 25 MG/1
TABLET, FILM COATED ORAL
Qty: 180 TABLET | Refills: 2 | Status: SHIPPED | OUTPATIENT
Start: 2021-10-28 | End: 2022-02-11

## 2021-10-28 NOTE — TELEPHONE ENCOUNTER
"Last Written Prescription Date:  1/28/21  Last Fill Quantity: 180,  # refills: 2   Last office visit: 5/5/2021 with prescribing provider:     Future Office Visit:      Requested Prescriptions   Pending Prescriptions Disp Refills     metoprolol tartrate (LOPRESSOR) 25 MG tablet [Pharmacy Med Name: METOPROLOL TARTRATE 25MG TABLETS] 180 tablet 2     Sig: TAKE 1 TABLET(25 MG) BY MOUTH TWICE DAILY       Beta-Blockers Protocol Passed - 10/28/2021  3:28 AM        Passed - Blood pressure under 140/90 in past 12 months     BP Readings from Last 3 Encounters:   05/05/21 128/80   02/20/20 127/83   07/30/19 132/85                 Passed - Patient is age 6 or older        Passed - Recent (12 mo) or future (30 days) visit within the authorizing provider's specialty     Patient has had an office visit with the authorizing provider or a provider within the authorizing providers department within the previous 12 mos or has a future within next 30 days. See \"Patient Info\" tab in inbasket, or \"Choose Columns\" in Meds & Orders section of the refill encounter.              Passed - Medication is active on med list           Prescription approved per [facility] Refill Protocol.    Juilssa MELENDEZ, RN    [facility]  Office: [phone]  Fax: [phone]        "

## 2022-02-04 ENCOUNTER — LAB (OUTPATIENT)
Dept: LAB | Facility: CLINIC | Age: 57
End: 2022-02-04
Payer: COMMERCIAL

## 2022-02-04 DIAGNOSIS — R73.01 IFG (IMPAIRED FASTING GLUCOSE): ICD-10-CM

## 2022-02-04 DIAGNOSIS — I10 BENIGN ESSENTIAL HYPERTENSION: ICD-10-CM

## 2022-02-04 DIAGNOSIS — I25.10 CORONARY ARTERY DISEASE INVOLVING NATIVE CORONARY ARTERY OF NATIVE HEART WITHOUT ANGINA PECTORIS: ICD-10-CM

## 2022-02-04 LAB
ALBUMIN SERPL-MCNC: 3.2 G/DL (ref 3.4–5)
ALP SERPL-CCNC: 131 U/L (ref 40–150)
ALT SERPL W P-5'-P-CCNC: 51 U/L (ref 0–70)
ANION GAP SERPL CALCULATED.3IONS-SCNC: 4 MMOL/L (ref 3–14)
AST SERPL W P-5'-P-CCNC: 17 U/L (ref 0–45)
BILIRUB SERPL-MCNC: 0.3 MG/DL (ref 0.2–1.3)
BUN SERPL-MCNC: 12 MG/DL (ref 7–30)
CALCIUM SERPL-MCNC: 8.4 MG/DL (ref 8.5–10.1)
CHLORIDE BLD-SCNC: 109 MMOL/L (ref 94–109)
CHOLEST SERPL-MCNC: 110 MG/DL
CO2 SERPL-SCNC: 26 MMOL/L (ref 20–32)
CREAT SERPL-MCNC: 0.85 MG/DL (ref 0.66–1.25)
FASTING STATUS PATIENT QL REPORTED: YES
GFR SERPL CREATININE-BSD FRML MDRD: >90 ML/MIN/1.73M2
GLUCOSE BLD-MCNC: 117 MG/DL (ref 70–99)
HBA1C MFR BLD: 6.3 % (ref 0–5.6)
HDLC SERPL-MCNC: 35 MG/DL
LDLC SERPL CALC-MCNC: 47 MG/DL
NONHDLC SERPL-MCNC: 75 MG/DL
POTASSIUM BLD-SCNC: 4.4 MMOL/L (ref 3.4–5.3)
PROT SERPL-MCNC: 6.7 G/DL (ref 6.8–8.8)
SODIUM SERPL-SCNC: 139 MMOL/L (ref 133–144)
TRIGL SERPL-MCNC: 141 MG/DL

## 2022-02-04 PROCEDURE — 36415 COLL VENOUS BLD VENIPUNCTURE: CPT

## 2022-02-04 PROCEDURE — 80061 LIPID PANEL: CPT

## 2022-02-04 PROCEDURE — 80053 COMPREHEN METABOLIC PANEL: CPT

## 2022-02-04 PROCEDURE — 83036 HEMOGLOBIN GLYCOSYLATED A1C: CPT

## 2022-02-11 ENCOUNTER — OFFICE VISIT (OUTPATIENT)
Dept: OTHER | Facility: CLINIC | Age: 57
End: 2022-02-11
Attending: INTERNAL MEDICINE
Payer: COMMERCIAL

## 2022-02-11 VITALS
DIASTOLIC BLOOD PRESSURE: 84 MMHG | SYSTOLIC BLOOD PRESSURE: 144 MMHG | OXYGEN SATURATION: 98 % | WEIGHT: 218 LBS | HEART RATE: 60 BPM | BODY MASS INDEX: 33.15 KG/M2

## 2022-02-11 DIAGNOSIS — E78.2 MIXED HYPERLIPIDEMIA: ICD-10-CM

## 2022-02-11 DIAGNOSIS — I10 BENIGN ESSENTIAL HYPERTENSION: ICD-10-CM

## 2022-02-11 DIAGNOSIS — R73.01 IFG (IMPAIRED FASTING GLUCOSE): ICD-10-CM

## 2022-02-11 DIAGNOSIS — F17.218 CIGARETTE NICOTINE DEPENDENCE WITH OTHER NICOTINE-INDUCED DISORDER: ICD-10-CM

## 2022-02-11 DIAGNOSIS — I25.10 CORONARY ARTERY DISEASE INVOLVING NATIVE CORONARY ARTERY OF NATIVE HEART WITHOUT ANGINA PECTORIS: Primary | ICD-10-CM

## 2022-02-11 DIAGNOSIS — E66.01 MORBID OBESITY, UNSPECIFIED OBESITY TYPE (H): ICD-10-CM

## 2022-02-11 PROCEDURE — 99406 BEHAV CHNG SMOKING 3-10 MIN: CPT | Performed by: INTERNAL MEDICINE

## 2022-02-11 PROCEDURE — G0463 HOSPITAL OUTPT CLINIC VISIT: HCPCS

## 2022-02-11 PROCEDURE — 99214 OFFICE O/P EST MOD 30 MIN: CPT | Performed by: INTERNAL MEDICINE

## 2022-02-11 RX ORDER — METOPROLOL TARTRATE 25 MG/1
25 TABLET, FILM COATED ORAL 2 TIMES DAILY
Qty: 180 TABLET | Refills: 3 | Status: SHIPPED | OUTPATIENT
Start: 2022-02-11 | End: 2023-02-27

## 2022-02-11 RX ORDER — ATORVASTATIN CALCIUM 80 MG/1
80 TABLET, FILM COATED ORAL DAILY
Qty: 90 TABLET | Refills: 3 | Status: SHIPPED | OUTPATIENT
Start: 2022-02-11 | End: 2023-02-27

## 2022-02-11 NOTE — PATIENT INSTRUCTIONS
Quit smoking use OTC gum if needed     Continue same meds     Avoid mountain due , sweets, cookies etc ( your sugars will improve )     Lose 10 more pounds     RTC 6 months , before visit fasting labs, A1c and CMP

## 2022-02-11 NOTE — PROGRESS NOTES
SUBJECTIVE:  CC:    Follow up   Review of recent labs  Still smokes 3-4 cigs a day   Med refills etc    HPI:  Mason Powers is a 56 year old male with past medical history of hypertension, obesity, nicotine dependence, coronary artery disease status post drug-eluting stent, dyslipidemia, small multiple lung nodules here today for the follow-up visit.  He underwent CT chest few months ago  for lung nodule follow-up still small multiple nodules noted and is asymptomatic, Radiologist suggested no further follow up .  He still smokes  3-4 cigs a day , in the past he quit successfully He denies any chest pain, shortness of breath or palpitations . He did not require any nitroglycerin use within the last year.  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  A1c now 6.3 <-- 5.9 ( 6.0)   Lipid panel excellent range  Reviewed recent CT chest and labs  with the patient  HISTORIES:  PROBLEM LIST:   Patient Active Problem List   Diagnosis     Abdominal pain     Hypertension     Morbid obesity (H)     Nicotine dependence     NSTEMI (non-ST elevated myocardial infarction) (H)     CAD (coronary artery disease)     Dyslipidemia     Lung nodule, small 0.4 mm on routine CT needs repeat CT in june 2016.     Epigastric discomfort     PAST MEDICAL HISTORY:  Past Medical History:   Diagnosis Date     Abdominal pain 6/2015    with proximal SMA inflammation vasculatis w/u negative.no discetion     CAD (coronary artery disease) 8/2015    RUTH ANN-OM, mod RCA and mod/sev small PDA, mild-mod diffuse  Lad, Diag small with mod/sev     Dyslipidaemia      GERD (gastroesophageal reflux disease)      HTN (hypertension)      Lung nodule     very small 0.4 mm incidental finding on CT.     Morbid obesity (H) 7/7/2015     Nicotine dependence      NSTEMI (non-ST elevated myocardial  infarction) (H) 2015     Uncomplicated asthma      PAST SURGICAL HISTORY:  Past Surgical History:   Procedure Laterality Date     STENT, CORONARY, S660 15/18  2015     RUTH ANN-OM2( 50%RCA, 50-70 PDA,50% LAD, diag small >50%, RUTH ANN to OM     CURRENT MEDICATIONS:  Current Outpatient Medications   Medication Sig Dispense Refill     aspirin EC 81 MG EC tablet Take 1 tablet (81 mg) by mouth daily       atorvastatin (LIPITOR) 80 MG tablet Take 1 tablet (80 mg) by mouth daily 90 tablet 3     metoprolol tartrate (LOPRESSOR) 25 MG tablet Take 1 tablet (25 mg) by mouth 2 times daily 180 tablet 3     nitroGLYcerin (NITROSTAT) 0.4 MG sublingual tablet DISSOLVE 1 TABLET UNDER THE TONGUE EVERY 5 MINUTES AS NEEDED FOR CHEST PAIN 25 tablet 0     ALLERGIES:  Allergies   Allergen Reactions     Shrimp Difficulty breathing     Throat starts to swell      SOCIAL HISTORY:  Social History     Socioeconomic History     Marital status:      Spouse name: Aurea     Number of children: 2     Years of education: Not on file     Highest education level: Not on file   Occupational History     Occupation:      Comment:    Tobacco Use     Smoking status: Current Every Day Smoker     Types: Cigarettes     Smokeless tobacco: Former User     Quit date: 7/29/2015     Tobacco comment: 3-4 cigarettes daily, smoking for the last 30 plus years. quit but restarted 1 pk/week   Substance and Sexual Activity     Alcohol use: Yes     Alcohol/week: 0.0 standard drinks     Comment: socially     Drug use: No     Comment: recently quit      Sexual activity: Yes     Partners: Female   Other Topics Concern     Parent/sibling w/ CABG, MI or angioplasty before 65F 55M? Not Asked      Service Not Asked     Blood Transfusions Not Asked     Caffeine Concern Yes     Comment: 1 pot a coffee occas days      Occupational Exposure Not Asked     Hobby Hazards Not Asked     Sleep Concern Not Asked     Stress Concern Not Asked     Weight Concern Not  Asked     Special Diet Yes     Comment: more wheat/grains, less sugar, leaner meats     Back Care Not Asked     Exercise Yes     Comment: get some walking during the week      Bike Helmet Not Asked     Seat Belt Not Asked     Self-Exams Not Asked   Social History Narrative     Not on file     Social Determinants of Health     Financial Resource Strain: Not on file   Food Insecurity: Not on file   Transportation Needs: Not on file   Physical Activity: Not on file   Stress: Not on file   Social Connections: Not on file   Intimate Partner Violence: Not on file   Housing Stability: Not on file     FAMILY HISTORY:  Family History   Problem Relation Age of Onset     Diabetes Father      Diabetes Brother      REVIEW OF SYSTEMS:  CONSTITUTIONAL:no malaise, fatigue, or other general symptoms  EYES: no subjective changes in visual acuity, no photophobia  ENT/MOUTH: no complaints of rhinorrhea, nasal congestion, sore throat, hearing changes  RESP:no SOB  CV: no c/o exertional chest pressure or LIZ  GI: No abdominal pain, constipation, change in bowel movements, nausea, pyrosis, BRBPR  :no polyuria or polydipsia, no dysuria, no gross hematuria  MUSCULOSKELATAL:no arthalgias or myalgias  INTEGUMENTARY/SKIN: no pruritis, rashes, or moles with recent change in size, shape, or pigmentation  NEURO: no gross sensory or motor symptoms, no dizziness, no confusion  ENDOCRINE: no polyuria or polydipsia, no heat or cold intolerance  HEME/ALLERGY/IMMUNE: no fevers, chills, night sweats, or unwanted weight loss  PSYCHIATRIC: no depression, anxiety, or internal stimuli  EXAM:  BP (!) 144/84 (BP Location: Right arm, Patient Position: Chair, Cuff Size: Adult Regular)   Pulse 60   Wt 218 lb (98.9 kg)   SpO2 98%   BMI 33.15 kg/m    BMI: Body mass index is 33.15 kg/m .  GENERAL APPEARANCE:  Pleasant  Healthy appearing male , alert, active, no distress cooperative.  EXAM:  EYES: clear conjunctiva, no cataracts, no obvious fundoscopic  abnormalities  HENT: oropharynx, nares, and TMs are WNL  NECK: no JVD, thyromegaly or lymphadenopathy, no cervical bruits  RESP: clear to auscultation without rales, wheezes, or rhonchi  CV: RRR, no murmurs, gallops, or rubs  LYMPH: no cervical , axillary, or inguinal lymphadenopathy appreciated  GI: NABS, ND/NT, no masses or organomegally appreciated  MS: no obvoius clinicallly relevant arthropathy, no evidence vasculitis  SKIN: no nevi clinically suspicious for malignancy are noted  NEURO: CN II-XII intact, no localizing sensory or motor abnoramlities noted, DTRs symmetrical bilaterally  PSYCH: Mental status exam reveals the pt to have normal mood and affect. There is no disorder of thought form or content. There is no response to internal stimuli. There is no suicidal or homicidal ideation.    A/p:  (I25.10) Coronary artery disease involving native coronary artery of native heart without angina pectoris  (primary encounter diagnosis)  Comment: denies any CP or SOB, risk factors are well controlled  Except still smokes   He must quit smoking   Cont station., asa , BB etc  Refill medications    (I10) Benign essential hypertension  Initial blood pressure elevated repeat blood pressure improved  Avoid NSAIDs, coffee, tea etc.  Lose weight  Monitor blood pressure at home continue metoprolol  Plan:     (R73.01) IFG (impaired fasting glucose)  Comment: Recent A1c 6.3 and before it was 5.9 TLC suggested , repeat A1c and CMP in 6 months  Plan:     (F17.218) Cigarette nicotine dependence with other nicotine-induced disorder  Comment: he smokes 3-4 cigs a day, educated benefits of quitting , he wanted to quit on his own.   Suggested utilizing gum OTC  More than 3 minutes of smoking cessation counseling was done with the patient  Plan:     (R91.8) Pulmonary nodules  Comment: quit smoking and follow up with primary for surveillance   Reviewed previous CT scan results radiologist suggested no further follow-up but if he  continues to smoke he may need repeat CT chest through primary care office  This was discussed with the patient    (E78.2) Mixed hyperlipidemia  Comment: well controlled with atorva and TLC continue same   Repeat FLP in 1 year  Plan:     (E66.01) Morbid obesity, unspecified obesity type (H)  He lost some weight since last visit still weighs 218 pounds  Continue same plan  More than 35 minutes spent on the date of the encounter doing chart review, history and exam, documentation and further activities as noted above.  More than 3 minutes of smoking cessation counseling was done    Niesha Lew MD, SUSAN, [facility]  Vascular Medicine  Clinical lipidologist  Clinical hypertension specialist

## 2022-02-11 NOTE — PROGRESS NOTES
[facility]        Patient is here for a  follow up.      Pt is currently taking Aspirin and Statin.    BP (!) 144/84 (BP Location: Right arm, Patient Position: Chair, Cuff Size: Adult Regular)   Pulse 60   Wt 218 lb (98.9 kg)   SpO2 98%   BMI 33.15 kg/m      The provider has been notified that the patient has no concerns.    Questions patient would like addressed today are: N/A.    Refills are needed: N/A    Has homecare services and agency name:  Lilliana Reed MA

## 2022-08-19 ENCOUNTER — LAB (OUTPATIENT)
Dept: LAB | Facility: CLINIC | Age: 57
End: 2022-08-19
Payer: COMMERCIAL

## 2022-08-19 DIAGNOSIS — R73.01 IFG (IMPAIRED FASTING GLUCOSE): ICD-10-CM

## 2022-08-19 DIAGNOSIS — I10 BENIGN ESSENTIAL HYPERTENSION: ICD-10-CM

## 2022-08-19 DIAGNOSIS — E78.2 MIXED HYPERLIPIDEMIA: ICD-10-CM

## 2022-08-19 LAB — HBA1C MFR BLD: 6 % (ref 0–5.6)

## 2022-08-19 PROCEDURE — 36415 COLL VENOUS BLD VENIPUNCTURE: CPT

## 2022-08-19 PROCEDURE — 80053 COMPREHEN METABOLIC PANEL: CPT

## 2022-08-19 PROCEDURE — 83036 HEMOGLOBIN GLYCOSYLATED A1C: CPT

## 2022-08-20 LAB
ALBUMIN SERPL-MCNC: 3.6 G/DL (ref 3.4–5)
ALP SERPL-CCNC: 124 U/L (ref 40–150)
ALT SERPL W P-5'-P-CCNC: 66 U/L (ref 0–70)
ANION GAP SERPL CALCULATED.3IONS-SCNC: 6 MMOL/L (ref 3–14)
AST SERPL W P-5'-P-CCNC: 27 U/L (ref 0–45)
BILIRUB SERPL-MCNC: 0.5 MG/DL (ref 0.2–1.3)
BUN SERPL-MCNC: 13 MG/DL (ref 7–30)
CALCIUM SERPL-MCNC: 8.6 MG/DL (ref 8.5–10.1)
CHLORIDE BLD-SCNC: 111 MMOL/L (ref 94–109)
CO2 SERPL-SCNC: 23 MMOL/L (ref 20–32)
CREAT SERPL-MCNC: 0.77 MG/DL (ref 0.66–1.25)
GFR SERPL CREATININE-BSD FRML MDRD: >90 ML/MIN/1.73M2
GLUCOSE BLD-MCNC: 110 MG/DL (ref 70–99)
POTASSIUM BLD-SCNC: 4.6 MMOL/L (ref 3.4–5.3)
PROT SERPL-MCNC: 6.7 G/DL (ref 6.8–8.8)
SODIUM SERPL-SCNC: 140 MMOL/L (ref 133–144)

## 2022-08-26 ENCOUNTER — OFFICE VISIT (OUTPATIENT)
Dept: OTHER | Facility: CLINIC | Age: 57
End: 2022-08-26
Attending: INTERNAL MEDICINE
Payer: COMMERCIAL

## 2022-08-26 VITALS
HEIGHT: 68 IN | SYSTOLIC BLOOD PRESSURE: 138 MMHG | DIASTOLIC BLOOD PRESSURE: 93 MMHG | BODY MASS INDEX: 30.46 KG/M2 | HEART RATE: 97 BPM | WEIGHT: 201 LBS | OXYGEN SATURATION: 97 %

## 2022-08-26 DIAGNOSIS — E66.01 MORBID OBESITY, UNSPECIFIED OBESITY TYPE (H): ICD-10-CM

## 2022-08-26 DIAGNOSIS — R73.01 IFG (IMPAIRED FASTING GLUCOSE): ICD-10-CM

## 2022-08-26 DIAGNOSIS — I10 BENIGN ESSENTIAL HYPERTENSION: ICD-10-CM

## 2022-08-26 DIAGNOSIS — F17.218 CIGARETTE NICOTINE DEPENDENCE WITH OTHER NICOTINE-INDUCED DISORDER: ICD-10-CM

## 2022-08-26 DIAGNOSIS — I25.10 CORONARY ARTERY DISEASE INVOLVING NATIVE CORONARY ARTERY OF NATIVE HEART WITHOUT ANGINA PECTORIS: Primary | ICD-10-CM

## 2022-08-26 DIAGNOSIS — E78.2 MIXED HYPERLIPIDEMIA: ICD-10-CM

## 2022-08-26 DIAGNOSIS — I25.10 CORONARY ARTERY DISEASE INVOLVING NATIVE CORONARY ARTERY OF NATIVE HEART WITHOUT ANGINA PECTORIS: ICD-10-CM

## 2022-08-26 PROCEDURE — 99406 BEHAV CHNG SMOKING 3-10 MIN: CPT | Performed by: INTERNAL MEDICINE

## 2022-08-26 PROCEDURE — 99214 OFFICE O/P EST MOD 30 MIN: CPT | Performed by: INTERNAL MEDICINE

## 2022-08-26 RX ORDER — NITROGLYCERIN 0.4 MG/1
0.4 TABLET SUBLINGUAL EVERY 5 MIN PRN
Qty: 25 TABLET | Refills: 1 | Status: SHIPPED | OUTPATIENT
Start: 2022-08-26 | End: 2023-03-10

## 2022-08-26 NOTE — PATIENT INSTRUCTIONS
Congratulations for weight loss continue same plan    Please quit smoking     Continue current medications    Fasting labs in 6 months then virtual visit

## 2022-08-26 NOTE — PROGRESS NOTES
SUBJECTIVE:  CC:    Follow up   Review of recent labs  Still smokes 4 cigs a day /4 days a week   Med refills etc  Intentional weight loss  No abdominal pain   HPI:  Mason Powers is a 57 year old male with past medical history of hypertension, obesity, nicotine dependence, coronary artery disease status post drug-eluting stent, dyslipidemia, small multiple lung nodules here today for the follow-up visit.  He underwent CT chest few months ago  for lung nodule follow-up still small multiple nodules noted and is asymptomatic, Radiologist suggested no further follow up .  He still smokes  4 cigs a day/4 days a week  , in the past he quit successfully He denies any chest pain, shortness of breath or palpitations . He did not require any nitroglycerin use within the last year. 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  A1c now  6.0<-6.3 <-- 5.9 ( 6.0)   Previous Lipid panel excellent range  Reviewed CT chest and labs  with the patient  HISTORIES:  PROBLEM LIST:   Patient Active Problem List   Diagnosis     Abdominal pain     Hypertension     Morbid obesity (H)     Nicotine dependence     NSTEMI (non-ST elevated myocardial infarction) (H)     CAD (coronary artery disease)     Dyslipidemia     Lung nodule, small 0.4 mm on routine CT needs repeat CT in 2016.     Epigastric discomfort     PAST MEDICAL HISTORY:  Past Medical History:   Diagnosis Date     Abdominal pain 2015    with proximal SMA inflammation vasculatis w/u negative.no discetion     CAD (coronary artery disease) 2015    RUTH ANN-OM, mod RCA and mod/sev small PDA, mild-mod diffuse  Lad, Diag small with mod/sev     Dyslipidaemia      GERD (gastroesophageal reflux disease)      HTN (hypertension)      Lung nodule     very small 0.4 mm incidental finding on CT.     Morbid obesity (H) 2015     Nicotine dependence      NSTEMI (non-ST  elevated myocardial infarction) (H) 2015     Uncomplicated asthma      PAST SURGICAL HISTORY:  Past Surgical History:   Procedure Laterality Date     STENT, CORONARY, S660 15/18  2015     RUTH ANN-OM2( 50%RCA, 50-70 PDA,50% LAD, diag small >50%, RUTH ANN to OM     CURRENT MEDICATIONS:  Current Outpatient Medications   Medication Sig Dispense Refill     aspirin EC 81 MG EC tablet Take 1 tablet (81 mg) by mouth daily       atorvastatin (LIPITOR) 80 MG tablet Take 1 tablet (80 mg) by mouth daily 90 tablet 3     metoprolol tartrate (LOPRESSOR) 25 MG tablet Take 1 tablet (25 mg) by mouth 2 times daily 180 tablet 3     nitroGLYcerin (NITROSTAT) 0.4 MG sublingual tablet Place 1 tablet (0.4 mg) under the tongue every 5 minutes as needed for chest pain For chest pain place 1 tablet under the tongue every 5 minutes for 3 doses. If symptoms persist 5 minutes after 1st dose call 911. 25 tablet 1     ALLERGIES:  Allergies   Allergen Reactions     Shrimp Difficulty breathing     Throat starts to swell      SOCIAL HISTORY:  Social History     Socioeconomic History     Marital status:      Spouse name: Aurea     Number of children: 2     Years of education: Not on file     Highest education level: Not on file   Occupational History     Occupation:      Comment:    Tobacco Use     Smoking status: Current Every Day Smoker     Types: Cigarettes     Smokeless tobacco: Former User     Quit date: 7/29/2015     Tobacco comment: 3-4 cigarettes daily, smoking for the last 30 plus years. quit but restarted 1 pk/week   Substance and Sexual Activity     Alcohol use: Yes     Alcohol/week: 0.0 standard drinks     Comment: socially     Drug use: No     Comment: recently quit      Sexual activity: Yes     Partners: Female   Other Topics Concern     Parent/sibling w/ CABG, MI or angioplasty before 65F 55M? Not Asked      Service Not Asked     Blood Transfusions Not Asked     Caffeine Concern Yes     Comment: 1 pot a  "coffee occas days      Occupational Exposure Not Asked     Hobby Hazards Not Asked     Sleep Concern Not Asked     Stress Concern Not Asked     Weight Concern Not Asked     Special Diet Yes     Comment: more wheat/grains, less sugar, leaner meats     Back Care Not Asked     Exercise Yes     Comment: get some walking during the week      Bike Helmet Not Asked     Seat Belt Not Asked     Self-Exams Not Asked   Social History Narrative     Not on file     Social Determinants of Health     Financial Resource Strain: Not on file   Food Insecurity: Not on file   Transportation Needs: Not on file   Physical Activity: Not on file   Stress: Not on file   Social Connections: Not on file   Intimate Partner Violence: Not on file   Housing Stability: Not on file     FAMILY HISTORY:  Family History   Problem Relation Age of Onset     Diabetes Father      Diabetes Brother      REVIEW OF SYSTEMS:  CONSTITUTIONAL:no malaise, fatigue, or other general symptoms  EYES: no subjective changes in visual acuity, no photophobia  ENT/MOUTH: no complaints of rhinorrhea, nasal congestion, sore throat, hearing changes  RESP:no SOB  CV: no c/o exertional chest pressure or LIZ  GI: No abdominal pain, constipation, change in bowel movements, nausea, pyrosis, BRBPR  :no polyuria or polydipsia, no dysuria, no gross hematuria  MUSCULOSKELATAL:no arthalgias or myalgias  INTEGUMENTARY/SKIN: no pruritis, rashes, or moles with recent change in size, shape, or pigmentation  NEURO: no gross sensory or motor symptoms, no dizziness, no confusion  ENDOCRINE: no polyuria or polydipsia, no heat or cold intolerance  HEME/ALLERGY/IMMUNE: no fevers, chills, night sweats, or unwanted weight loss  PSYCHIATRIC: no depression, anxiety, or internal stimuli  EXAM:  BP (!) 138/93 (BP Location: Right arm, Patient Position: Chair, Cuff Size: Adult Regular)   Pulse 97   Ht 5' 8\" (1.727 m)   Wt 201 lb (91.2 kg)   SpO2 97%   BMI 30.56 kg/m    BMI: Body mass index is " 30.56 kg/m .  GENERAL APPEARANCE:  Pleasant  Healthy appearing male , alert, active, no distress cooperative.  EXAM:  EYES: clear conjunctiva, no cataracts, no obvious fundoscopic abnormalities  HENT: oropharynx, nares, and TMs are WNL  NECK: no JVD, thyromegaly or lymphadenopathy, no cervical bruits  RESP: clear to auscultation without rales, wheezes, or rhonchi  CV: RRR, no murmurs, gallops, or rubs  LYMPH: no cervical , axillary, or inguinal lymphadenopathy appreciated  GI: NABS, ND/NT, no masses or organomegally appreciated  MS: no obvoius clinicallly relevant arthropathy, no evidence vasculitis  SKIN: no nevi clinically suspicious for malignancy are noted  NEURO: CN II-XII intact, no localizing sensory or motor abnoramlities noted, DTRs symmetrical bilaterally  PSYCH: Mental status exam reveals the pt to have normal mood and affect. There is no disorder of thought form or content. There is no response to internal stimuli. There is no suicidal or homicidal ideation.    A/p:  (I25.10) Coronary artery disease involving native coronary artery of native heart without angina pectoris  (primary encounter diagnosis)  Comment: denies any CP or SOB, risk factors are well controlled  Except still smokes   He must quit smoking , see below  Cont statin., asa , BB etc  Refill medications when due     (I10) Benign essential hypertension  Initial blood pressure elevated repeat blood pressure improved  Avoid NSAIDs, coffee, tea etc.  Lose weight  Monitor blood pressure at home continue metoprolol  Plan:     (R73.01) IFG (impaired fasting glucose)  Comment: Recent A1c 6.0<-- 6.3 and before it was 5.9 TLC suggested , repeat A1c and CMP in 6 months  Plan:     (F17.218) Cigarette nicotine dependence with other nicotine-induced disorder  He started smoking since 7th grade , 1/2 to 1 pk a day and quit in between few years   Currently  smokes 4 cigs a day for 4 days , educated benefits of quitting , he wanted to quit on his own.    Suggested utilizing gum OTC  More than 3 minutes of smoking cessation counseling was done with the patient  Plan:     (R91.8) Pulmonary nodules  Comment: quit smoking and follow up with primary for surveillance   Reviewed previous CT scan results radiologist suggested no further follow-up but if he continues to smoke he may need repeat CT chest through primary care office  This was discussed with the patient    (E78.2) Mixed hyperlipidemia  Comment: well controlled with atorva and TLC continue same   Repeat FLP in 6 year  Plan:     (E66.01) Morbid obesity, unspecified obesity type (H)  He lost  17 pounds weight since last visit , congratulated   Continue same plan  More than 35 minutes spent on the date of the encounter doing chart review, history and exam, documentation and further activities as noted above.  More than 3 minutes of smoking cessation counseling was done    Niesha Lew MD, SUSAN, [facility]  Vascular Medicine  Clinical lipidologist  Clinical hypertension specialist

## 2022-08-26 NOTE — PROGRESS NOTES
"Patient is here to discuss follow up.    BP (!) 138/93 (BP Location: Right arm, Patient Position: Chair, Cuff Size: Adult Regular)   Pulse 97   Ht 5' 8\" (1.727 m)   Wt 201 lb (91.2 kg)   SpO2 97%   BMI 30.56 kg/m      Questions patient would like addressed today are: N/A.    Refills are needed: N/A    Has homecare services and agency name:  Lilliana Bingham  "

## 2023-02-27 DIAGNOSIS — I10 BENIGN ESSENTIAL HYPERTENSION: ICD-10-CM

## 2023-02-27 DIAGNOSIS — E78.2 MIXED HYPERLIPIDEMIA: ICD-10-CM

## 2023-02-27 DIAGNOSIS — I25.10 CORONARY ARTERY DISEASE INVOLVING NATIVE CORONARY ARTERY OF NATIVE HEART WITHOUT ANGINA PECTORIS: ICD-10-CM

## 2023-02-27 RX ORDER — METOPROLOL TARTRATE 25 MG/1
25 TABLET, FILM COATED ORAL 2 TIMES DAILY
Qty: 30 TABLET | Refills: 0 | Status: SHIPPED | OUTPATIENT
Start: 2023-02-27 | End: 2023-03-10

## 2023-02-27 RX ORDER — ATORVASTATIN CALCIUM 80 MG/1
80 TABLET, FILM COATED ORAL DAILY
Qty: 30 TABLET | Refills: 0 | Status: SHIPPED | OUTPATIENT
Start: 2023-02-27 | End: 2023-03-10

## 2023-02-27 NOTE — TELEPHONE ENCOUNTER
Last Written Prescription Date:  2/11/22  Last Fill Quantity: 90 and 180,  # refills: 3   Last office visit: 8/26/2022 with prescribing provider:  Dr. Lew.   Future Office Visit:  3/10/23.     Requested Prescriptions   Pending Prescriptions Disp Refills     metoprolol tartrate (LOPRESSOR) 25 MG tablet 30 tablet 0     Sig: Take 1 tablet (25 mg) by mouth 2 times daily       There is no refill protocol information for this order        atorvastatin (LIPITOR) 80 MG tablet 30 tablet 0     Sig: Take 1 tablet (80 mg) by mouth daily       There is no refill protocol information for this order          Unable to refill per [facility] Rx protocol.   Medication for 30 day refill (to cover until next OV) and pharmacy loaded.   Routing to MD for approval.     MATY MorinN, RN

## 2023-03-03 ENCOUNTER — LAB (OUTPATIENT)
Dept: LAB | Facility: CLINIC | Age: 58
End: 2023-03-03
Payer: COMMERCIAL

## 2023-03-03 DIAGNOSIS — E78.2 MIXED HYPERLIPIDEMIA: ICD-10-CM

## 2023-03-03 DIAGNOSIS — R73.01 IFG (IMPAIRED FASTING GLUCOSE): ICD-10-CM

## 2023-03-03 LAB
ALBUMIN SERPL BCG-MCNC: 4.1 G/DL (ref 3.5–5.2)
ALP SERPL-CCNC: 104 U/L (ref 40–129)
ALT SERPL W P-5'-P-CCNC: 41 U/L (ref 10–50)
ANION GAP SERPL CALCULATED.3IONS-SCNC: 11 MMOL/L (ref 7–15)
AST SERPL W P-5'-P-CCNC: 30 U/L (ref 10–50)
BILIRUB SERPL-MCNC: 0.5 MG/DL
BUN SERPL-MCNC: 11.3 MG/DL (ref 6–20)
CALCIUM SERPL-MCNC: 8.5 MG/DL (ref 8.6–10)
CHLORIDE SERPL-SCNC: 105 MMOL/L (ref 98–107)
CHOLEST SERPL-MCNC: 138 MG/DL
CREAT SERPL-MCNC: 0.9 MG/DL (ref 0.67–1.17)
DEPRECATED HCO3 PLAS-SCNC: 25 MMOL/L (ref 22–29)
GFR SERPL CREATININE-BSD FRML MDRD: >90 ML/MIN/1.73M2
GLUCOSE SERPL-MCNC: 115 MG/DL (ref 70–99)
HBA1C MFR BLD: 5.8 % (ref 0–5.6)
HDLC SERPL-MCNC: 35 MG/DL
LDLC SERPL CALC-MCNC: 68 MG/DL
NONHDLC SERPL-MCNC: 103 MG/DL
POTASSIUM SERPL-SCNC: 4.4 MMOL/L (ref 3.4–5.3)
PROT SERPL-MCNC: 6.3 G/DL (ref 6.4–8.3)
SODIUM SERPL-SCNC: 141 MMOL/L (ref 136–145)
TRIGL SERPL-MCNC: 173 MG/DL

## 2023-03-03 PROCEDURE — 80053 COMPREHEN METABOLIC PANEL: CPT

## 2023-03-03 PROCEDURE — 80061 LIPID PANEL: CPT

## 2023-03-03 PROCEDURE — 83036 HEMOGLOBIN GLYCOSYLATED A1C: CPT

## 2023-03-03 PROCEDURE — 36415 COLL VENOUS BLD VENIPUNCTURE: CPT

## 2023-03-10 ENCOUNTER — VIRTUAL VISIT (OUTPATIENT)
Dept: OTHER | Facility: CLINIC | Age: 58
End: 2023-03-10
Attending: INTERNAL MEDICINE
Payer: COMMERCIAL

## 2023-03-10 DIAGNOSIS — F17.218 CIGARETTE NICOTINE DEPENDENCE WITH OTHER NICOTINE-INDUCED DISORDER: ICD-10-CM

## 2023-03-10 DIAGNOSIS — I25.10 CORONARY ARTERY DISEASE INVOLVING NATIVE CORONARY ARTERY OF NATIVE HEART WITHOUT ANGINA PECTORIS: Primary | ICD-10-CM

## 2023-03-10 DIAGNOSIS — R73.01 IFG (IMPAIRED FASTING GLUCOSE): ICD-10-CM

## 2023-03-10 DIAGNOSIS — E66.01 MORBID OBESITY, UNSPECIFIED OBESITY TYPE (H): ICD-10-CM

## 2023-03-10 DIAGNOSIS — E78.2 MIXED HYPERLIPIDEMIA: ICD-10-CM

## 2023-03-10 DIAGNOSIS — I10 BENIGN ESSENTIAL HYPERTENSION: ICD-10-CM

## 2023-03-10 PROCEDURE — 99406 BEHAV CHNG SMOKING 3-10 MIN: CPT | Mod: TEL | Performed by: INTERNAL MEDICINE

## 2023-03-10 PROCEDURE — 99214 OFFICE O/P EST MOD 30 MIN: CPT | Mod: TEL | Performed by: INTERNAL MEDICINE

## 2023-03-10 PROCEDURE — G0463 HOSPITAL OUTPT CLINIC VISIT: HCPCS | Mod: 25,TEL

## 2023-03-10 PROCEDURE — G0463 HOSPITAL OUTPT CLINIC VISIT: HCPCS | Mod: PN,TEL,25 | Performed by: INTERNAL MEDICINE

## 2023-03-10 RX ORDER — NITROGLYCERIN 0.4 MG/1
0.4 TABLET SUBLINGUAL EVERY 5 MIN PRN
Qty: 25 TABLET | Refills: 1 | Status: SHIPPED | OUTPATIENT
Start: 2023-03-10 | End: 2024-03-13

## 2023-03-10 RX ORDER — METOPROLOL TARTRATE 25 MG/1
25 TABLET, FILM COATED ORAL 2 TIMES DAILY
Qty: 90 TABLET | Refills: 3 | Status: SHIPPED | OUTPATIENT
Start: 2023-03-10 | End: 2023-12-19

## 2023-03-10 RX ORDER — ATORVASTATIN CALCIUM 80 MG/1
80 TABLET, FILM COATED ORAL DAILY
Qty: 90 TABLET | Refills: 3 | Status: SHIPPED | OUTPATIENT
Start: 2023-03-10 | End: 2024-01-11

## 2023-03-10 NOTE — PATIENT INSTRUCTIONS
1.  Your recent labs looks good A1c improved, congratulations for losing weight continue the same  2.  Please quit smoking if you decided to use patches or pills or gum please call our office for new prescription  3.  We will plan for fasting lipids, CMP, A1c and echocardiogram in 6 months then followed by office visit  Continue current medications and all of them were refilled

## 2023-03-10 NOTE — PROGRESS NOTES
Mason is a 57 year old who is being evaluated via a billable telephone visit.      What phone number would you like to be contacted at? [phone]  How would you like to obtain your AVS? Kerrie    Distant Location (provider location):  On-site     Objective       Vitals:  No vitals were obtained today due to virtual visit.    Karlos Deleon    Provider visit note:  Chief complaint:     Follow up   Review of recent labs  Still smokes 4 -5 cigs a day /4 days a week   Med refills etc  Intentional weight loss  No abdominal pain   HPI:  Mason Powers is a 57 year old male with past medical history of hypertension, obesity, nicotine dependence, coronary artery disease status post drug-eluting stent, dyslipidemia, small multiple lung nodules here today for the follow-up visit.  He underwent CT chest few months ago  for lung nodule follow-up still small multiple nodules noted and is asymptomatic, Radiologist suggested no further follow up .  He still smokes  4-5 cigs a day/4 days a week  , in the past he quit successfully He denies any chest pain, shortness of breath or palpitations . He did not require any nitroglycerin use within the last year. 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  A1c now 5.8<-  6.0<-6.3 <-- 5.9 ( 6.0)   Recent  LDL excellent range  Reviewed  Previous CT chest and labs  with the patient        Review of systems: Reviewed all 12 point review of systems as per HPI otherwise unremarkable    Physical exam:( no physical exam done this is virtual visit)    Reviewed recent laboratory tests, imaging studies in the epic and updated chart    Assessment and plan:  (I25.10) Coronary artery disease involving native coronary artery of native heart without angina pectoris  (primary encounter diagnosis)  Comment: denies any CP or SOB, risk factors are well controlled  Except still smokes   He  must quit smoking , see below  Cont statin., asa ,  Refilled medications   Plan for echocardiogram in 6 months     (I10) Benign essential hypertension  Well-controlled blood pressure with current regimen reviewed outside readings continue the same he is also losing weight     (R73.01) IFG (impaired fasting glucose)  Comment: Recent A1c 6.0<-- 6.3 and before it was 5.9 TLC suggested , repeat A1c and CMP in 6 months  Continue same plan     (F17.218) Cigarette nicotine dependence with other nicotine-induced disorder  He started smoking since 7th grade , 1/2 to 1 pk a day and quit in between few years   Currently  smokes 4 cigs a day for 4 days , educated benefits of quitting , he wanted to quit on his own.   Suggested utilizing gum OTC  More than 3 minutes of smoking cessation counseling was done with the patient       (R91.8) Pulmonary nodules  Comment: quit smoking and follow up with primary for surveillance   Reviewed previous CT scan results radiologist suggested no further follow-up but if he continues to smoke he may need repeat CT chest through primary care office  This was discussed with the patient     (E78.2) Mixed hyperlipidemia  Comment: well controlled with atorva and TLC continue same   Repeat FLP in 6  months  Plan:      (E66.01) Morbid obesity, unspecified obesity type (H)  He lost  30  pounds weight since last year, congratulated   Continue same plan    Phone visit start time: 9:55 AM  Location of the patient: Home  Location of the provider: [facility]     35 minutes spent on the date of the encounter doing chart review, history and exam, documentation and further activities as noted above.      This visit is being conducted as a virtual visit due to the emphasis on mitigation of the COVID-19 virus pandemic. The clinician has decided that the risk of an in-office visit outweighs the benefit for this patient.     This note was dictated by utilizing Dragon software    Copy of this  note to primary care physician  AVS with written instructions done    Niesha Lew MD, SUSAN, FNLA, FNLA, FACP  Vascular medicine  Clinical hypertension specialist  Clinical lipidologist

## 2023-08-28 ENCOUNTER — LAB (OUTPATIENT)
Dept: LAB | Facility: CLINIC | Age: 58
End: 2023-08-28
Payer: COMMERCIAL

## 2023-08-28 DIAGNOSIS — I10 BENIGN ESSENTIAL HYPERTENSION: ICD-10-CM

## 2023-08-28 DIAGNOSIS — E78.2 MIXED HYPERLIPIDEMIA: ICD-10-CM

## 2023-08-28 DIAGNOSIS — I25.10 CORONARY ARTERY DISEASE INVOLVING NATIVE CORONARY ARTERY OF NATIVE HEART WITHOUT ANGINA PECTORIS: ICD-10-CM

## 2023-08-28 DIAGNOSIS — R73.01 IFG (IMPAIRED FASTING GLUCOSE): ICD-10-CM

## 2023-08-28 LAB
ALBUMIN SERPL BCG-MCNC: 4.1 G/DL (ref 3.5–5.2)
ALP SERPL-CCNC: 105 U/L (ref 40–129)
ALT SERPL W P-5'-P-CCNC: 34 U/L (ref 0–70)
ANION GAP SERPL CALCULATED.3IONS-SCNC: 12 MMOL/L (ref 7–15)
AST SERPL W P-5'-P-CCNC: 30 U/L (ref 0–45)
BILIRUB SERPL-MCNC: 0.5 MG/DL
BUN SERPL-MCNC: 12.7 MG/DL (ref 6–20)
CALCIUM SERPL-MCNC: 8.8 MG/DL (ref 8.6–10)
CHLORIDE SERPL-SCNC: 104 MMOL/L (ref 98–107)
CHOLEST SERPL-MCNC: 101 MG/DL
CREAT SERPL-MCNC: 0.86 MG/DL (ref 0.67–1.17)
DEPRECATED HCO3 PLAS-SCNC: 25 MMOL/L (ref 22–29)
GFR SERPL CREATININE-BSD FRML MDRD: >90 ML/MIN/1.73M2
GLUCOSE SERPL-MCNC: 102 MG/DL (ref 70–99)
HBA1C MFR BLD: 6 % (ref 0–5.6)
HDLC SERPL-MCNC: 29 MG/DL
LDLC SERPL CALC-MCNC: 56 MG/DL
NONHDLC SERPL-MCNC: 72 MG/DL
POTASSIUM SERPL-SCNC: 4.3 MMOL/L (ref 3.4–5.3)
PROT SERPL-MCNC: 6.7 G/DL (ref 6.4–8.3)
SODIUM SERPL-SCNC: 141 MMOL/L (ref 136–145)
TRIGL SERPL-MCNC: 81 MG/DL

## 2023-08-28 PROCEDURE — 83036 HEMOGLOBIN GLYCOSYLATED A1C: CPT

## 2023-08-28 PROCEDURE — 36415 COLL VENOUS BLD VENIPUNCTURE: CPT

## 2023-08-28 PROCEDURE — 80053 COMPREHEN METABOLIC PANEL: CPT

## 2023-08-28 PROCEDURE — 80061 LIPID PANEL: CPT

## 2023-08-29 ENCOUNTER — HOSPITAL ENCOUNTER (OUTPATIENT)
Dept: CARDIOLOGY | Facility: CLINIC | Age: 58
Discharge: HOME | End: 2023-08-29
Attending: INTERNAL MEDICINE | Admitting: INTERNAL MEDICINE
Payer: COMMERCIAL

## 2023-08-29 DIAGNOSIS — I25.10 CORONARY ARTERY DISEASE INVOLVING NATIVE CORONARY ARTERY OF NATIVE HEART WITHOUT ANGINA PECTORIS: ICD-10-CM

## 2023-08-29 LAB — LVEF ECHO: NORMAL

## 2023-08-29 PROCEDURE — 93306 TTE W/DOPPLER COMPLETE: CPT

## 2023-08-29 PROCEDURE — 93306 TTE W/DOPPLER COMPLETE: CPT | Mod: 26 | Performed by: INTERNAL MEDICINE

## 2023-09-06 ENCOUNTER — OFFICE VISIT (OUTPATIENT)
Dept: OTHER | Facility: CLINIC | Age: 58
End: 2023-09-06
Attending: INTERNAL MEDICINE
Payer: COMMERCIAL

## 2023-09-06 VITALS
HEIGHT: 68 IN | WEIGHT: 201 LBS | OXYGEN SATURATION: 97 % | BODY MASS INDEX: 30.46 KG/M2 | HEART RATE: 57 BPM | DIASTOLIC BLOOD PRESSURE: 85 MMHG | SYSTOLIC BLOOD PRESSURE: 134 MMHG

## 2023-09-06 DIAGNOSIS — I10 BENIGN ESSENTIAL HYPERTENSION: ICD-10-CM

## 2023-09-06 DIAGNOSIS — E66.01 MORBID OBESITY, UNSPECIFIED OBESITY TYPE (H): ICD-10-CM

## 2023-09-06 DIAGNOSIS — F17.218 CIGARETTE NICOTINE DEPENDENCE WITH OTHER NICOTINE-INDUCED DISORDER: ICD-10-CM

## 2023-09-06 DIAGNOSIS — I25.10 CORONARY ARTERY DISEASE INVOLVING NATIVE CORONARY ARTERY OF NATIVE HEART WITHOUT ANGINA PECTORIS: Primary | ICD-10-CM

## 2023-09-06 DIAGNOSIS — E78.2 MIXED HYPERLIPIDEMIA: ICD-10-CM

## 2023-09-06 DIAGNOSIS — R73.01 IFG (IMPAIRED FASTING GLUCOSE): ICD-10-CM

## 2023-09-06 PROCEDURE — G0463 HOSPITAL OUTPT CLINIC VISIT: HCPCS

## 2023-09-06 PROCEDURE — 99406 BEHAV CHNG SMOKING 3-10 MIN: CPT | Performed by: INTERNAL MEDICINE

## 2023-09-06 PROCEDURE — 99214 OFFICE O/P EST MOD 30 MIN: CPT | Performed by: INTERNAL MEDICINE

## 2023-09-06 NOTE — PROGRESS NOTES
"Patient is here to discuss FOLLOW UP    /85 (BP Location: Right arm, Patient Position: Chair, Cuff Size: Adult Regular)   Pulse 57   Ht 5' 8\" (1.727 m)   Wt 201 lb (91.2 kg)   SpO2 97%   BMI 30.56 kg/m      Questions patient would like addressed today are: N/A.    Refills are needed: No    Has homecare services and agency name:  Lilliana HINES    "

## 2023-09-06 NOTE — PATIENT INSTRUCTIONS
Congratulations for weight loss , continue same plan    Quit smoking     Continue current medications     Follow up in March/April 2024 after fasting labs ( Lipids, CMP,. ,CBC,A1c , TSH )

## 2023-09-06 NOTE — PROGRESS NOTES
SUBJECTIVE:  CC:    Follow up   Review of recent labs  Still smokes 4 cigs a day /4 days a week   Intentional weight loss  No abdominal pain   HPI:  Mason Powers is a 58 year old male with past medical history of hypertension, obesity, nicotine dependence, coronary artery disease status post drug-eluting stent, dyslipidemia, small multiple lung nodules here today for the follow-up visit.  He underwent CT chest few months ago  for lung nodule follow-up still small multiple nodules noted and is asymptomatic, Radiologist suggested no further follow up .  He still smokes  4 cigs a day/4 days a week  , in the past he quit successfully He denies any chest pain, shortness of breath or palpitations . He did not require any nitroglycerin use within the last year. 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  A1c now  6.0<-6.3 <-- 5.9 ( 6.0)   Recent Lipid panel excellent range  Reviewed previous  CT results  HISTORIES:  PROBLEM LIST:   Patient Active Problem List   Diagnosis    Abdominal pain    Hypertension    Morbid obesity (H)    Nicotine dependence    NSTEMI (non-ST elevated myocardial infarction) (H)    CAD (coronary artery disease)    Dyslipidemia    Lung nodule, small 0.4 mm on routine CT needs repeat CT in 2016.    Epigastric discomfort     PAST MEDICAL HISTORY:  Past Medical History:   Diagnosis Date    Abdominal pain 2015    with proximal SMA inflammation vasculatis w/u negative.no discetion    CAD (coronary artery disease) 2015    RUTH ANN-OM, mod RCA and mod/sev small PDA, mild-mod diffuse  Lad, Diag small with mod/sev    Dyslipidaemia     GERD (gastroesophageal reflux disease)     HTN (hypertension)     Lung nodule     very small 0.4 mm incidental finding on CT.    Morbid obesity (H) 2015    Nicotine dependence     NSTEMI (non-ST elevated myocardial infarction) (H)     Uncomplicated  asthma      PAST SURGICAL HISTORY:  Past Surgical History:   Procedure Laterality Date    STENT, CORONARY, S660 15/18  2015     RUTH ANN-OM2( 50%RCA, 50-70 PDA,50% LAD, diag small >50%, RUTH ANN to OM     CURRENT MEDICATIONS:  Current Outpatient Medications   Medication Sig Dispense Refill    aspirin EC 81 MG EC tablet Take 1 tablet (81 mg) by mouth daily      atorvastatin (LIPITOR) 80 MG tablet Take 1 tablet (80 mg) by mouth daily 90 tablet 3    metoprolol tartrate (LOPRESSOR) 25 MG tablet Take 1 tablet (25 mg) by mouth 2 times daily 90 tablet 3    nitroGLYcerin (NITROSTAT) 0.4 MG sublingual tablet Place 1 tablet (0.4 mg) under the tongue every 5 minutes as needed for chest pain For chest pain place 1 tablet under the tongue every 5 minutes for 3 doses. If symptoms persist 5 minutes after 1st dose call 911. 25 tablet 1     ALLERGIES:  Allergies   Allergen Reactions    Shrimp Difficulty breathing     Throat starts to swell      SOCIAL HISTORY:  Social History     Socioeconomic History    Marital status:      Spouse name: Aurea    Number of children: 2    Years of education: Not on file    Highest education level: Not on file   Occupational History    Occupation:      Comment:    Tobacco Use    Smoking status: Every Day     Packs/day: 0.50     Types: Cigarettes    Smokeless tobacco: Former     Quit date: 7/29/2015    Tobacco comments:     3-4 cigarettes daily, smoking for the last 30 plus years. quit but restarted 1 pk/week   Substance and Sexual Activity    Alcohol use: Not Currently     Comment: socially    Drug use: No     Comment: recently quit     Sexual activity: Yes     Partners: Female   Other Topics Concern    Parent/sibling w/ CABG, MI or angioplasty before 65F 55M? Not Asked     Service Not Asked    Blood Transfusions Not Asked    Caffeine Concern Yes     Comment: 1 pot a coffee occas days     Occupational Exposure Not Asked    Hobby Hazards Not Asked    Sleep Concern Not Asked  "   Stress Concern Not Asked    Weight Concern Not Asked    Special Diet Yes     Comment: more wheat/grains, less sugar, leaner meats    Back Care Not Asked    Exercise Yes     Comment: get some walking during the week     Bike Helmet Not Asked    Seat Belt Not Asked    Self-Exams Not Asked   Social History Narrative    Not on file     Social Determinants of Health     Financial Resource Strain: Not on file   Food Insecurity: Not on file   Transportation Needs: Not on file   Physical Activity: Not on file   Stress: Not on file   Social Connections: Not on file   Intimate Partner Violence: Not on file   Housing Stability: Not on file     FAMILY HISTORY:  Family History   Problem Relation Age of Onset    Diabetes Father     Diabetes Brother      REVIEW OF SYSTEMS:  CONSTITUTIONAL:no malaise, fatigue, or other general symptoms  EYES: no subjective changes in visual acuity, no photophobia  ENT/MOUTH: no complaints of rhinorrhea, nasal congestion, sore throat, hearing changes  RESP:no SOB  CV: no c/o exertional chest pressure or LIZ  GI: No abdominal pain, constipation, change in bowel movements, nausea, pyrosis, BRBPR  :no polyuria or polydipsia, no dysuria, no gross hematuria  MUSCULOSKELATAL:no arthalgias or myalgias  INTEGUMENTARY/SKIN: no pruritis, rashes, or moles with recent change in size, shape, or pigmentation  NEURO: no gross sensory or motor symptoms, no dizziness, no confusion  ENDOCRINE: no polyuria or polydipsia, no heat or cold intolerance  HEME/ALLERGY/IMMUNE: no fevers, chills, night sweats, or unwanted weight loss  PSYCHIATRIC: no depression, anxiety, or internal stimuli  EXAM:  /85 (BP Location: Right arm, Patient Position: Chair, Cuff Size: Adult Regular)   Pulse 57   Ht 5' 8\" (1.727 m)   Wt 201 lb (91.2 kg)   SpO2 97%   BMI 30.56 kg/m    BMI: Body mass index is 30.56 kg/m .  GENERAL APPEARANCE:  Pleasant  Healthy appearing male , alert, active, no distress cooperative.  EXAM:  EYES: " clear conjunctiva, no cataracts, no obvious fundoscopic abnormalities  HENT: oropharynx, nares, and TMs are WNL  NECK: no JVD, thyromegaly or lymphadenopathy, no cervical bruits  RESP: clear to auscultation without rales, wheezes, or rhonchi  CV: RRR, no murmurs, gallops, or rubs  LYMPH: no cervical , axillary, or inguinal lymphadenopathy appreciated  GI: NABS, ND/NT, no masses or organomegally appreciated  MS: no obvoius clinicallly relevant arthropathy, no evidence vasculitis  SKIN: no nevi clinically suspicious for malignancy are noted  NEURO: CN II-XII intact, no localizing sensory or motor abnoramlities noted, DTRs symmetrical bilaterally  PSYCH: Mental status exam reveals the pt to have normal mood and affect. There is no disorder of thought form or content. There is no response to internal stimuli. There is no suicidal or homicidal ideation.  [facility]  Echocardiography Laboratory  [address] [place] [address]     Name: ABIGAIL SHRESTHA  MRN: [id number]  : 1965  Study Date: 2023 12:32 PM  Age: 58 yrs  Gender: Male  Patient Location: [facility]  Reason For Study: Coronary artery disease involving native coronary artery of  maria g  Ordering Physician: SANTI BROOKS  Referring Physician: SANTI BROOKS  Performed By: Charu Villafuerte     BSA: 2.0 m2  Height: 68 in  Weight: 201 lb  HR: 65  BP: 138/93 mmHg  ______________________________________________________________________________  Procedure  Complete Echo Adult.  ______________________________________________________________________________  Interpretation Summary     Left ventricular systolic function is normal.  The visual ejection fraction is 60-65%.  No regional wall motion abnormalities noted.  The study was technically adequate. Compared to the prior study dated ,  there have been no changes.  ______________________________________________________________________________  Left  Ventricle  The left ventricle is normal in size. There is mild concentric left  ventricular hypertrophy. Left ventricular systolic function is normal. The  visual ejection fraction is 60-65%. Left ventricular diastolic function is not  assessable. No regional wall motion abnormalities noted.     Right Ventricle  The right ventricle is normal size. The right ventricular systolic function is  normal.     Atria  Normal left atrial size. Right atrial size is normal. There is no color  Doppler evidence of an atrial shunt. Lipomatous hypertrophy of the interatrial  septum is noted.     Mitral Valve  There is trace mitral regurgitation.     Tricuspid Valve  No tricuspid regurgitation. Right ventricular systolic pressure could not be  approximated due to inadequate tricuspid regurgitation. IVC diameter <2.1 cm  collapsing >50% with sniff suggests a normal RA pressure of 3 mmHg.     Aortic Valve  The aortic valve is trileaflet. No aortic regurgitation is present. No aortic  stenosis is present.     Pulmonic Valve  The pulmonic valve is not well seen, but is grossly normal.     Vessels  The aortic root is normal size.     Pericardium  There is no pericardial effusion.     Rhythm  Sinus rhythm was noted.  ______________________________________________________________________________  MMode/2D Measurements & Calculations  IVSd: 1.3 cm     LVIDd: 5.1 cm  LVIDs: 3.2 cm  LVPWd: 1.1 cm  IVC diam: 1.4 cm  FS: 37.4 %  LV mass(C)d: 234.9 grams  LV mass(C)dI: 114.7 grams/m2  Ao root diam: 3.6 cm  LA dimension: 4.3 cm  asc Aorta Diam: 3.1 cm  LA/Ao: 1.2  LVOT diam: 2.2 cm  LVOT area: 3.6 cm2  Ao root diam Index (cm/m2): 1.8  asc Aorta Diam Index (cm/m2): 1.5  RV Base: 3.7 cm  RWT: 0.43  TAPSE: 2.2 cm     Doppler Measurements & Calculations  MV E max kassandra: 83.8 cm/sec  MV A max kassandra: 51.9 cm/sec  MV E/A: 1.6  MV max PG: 3.0 mmHg  MV mean P.3 mmHg  MV V2 VTI: 28.8 cm  MVA(VTI): 2.7 cm2  MV P1/2t max kassandra: 95.0 cm/sec  MV P1/2t: 84.8  msec  MVA(P1/2t): 2.6 cm2  MV dec slope: 328.0 cm/sec2  MV dec time: 0.19 sec     Ao V2 max: 137.3 cm/sec  Ao max P.0 mmHg  Ao V2 mean: 95.4 cm/sec  Ao mean P.1 mmHg  Ao V2 VTI: 28.2 cm  MARGY(I,D): 2.8 cm2  MARGY(V,D): 2.7 cm2  LV V1 max P.3 mmHg  LV V1 max: 103.8 cm/sec  LV V1 VTI: 21.6 cm  SV(LVOT): 78.5 ml  SI(LVOT): 38.3 ml/m2  PA V2 max: 105.2 cm/sec  PA max P.4 mmHg  PA mean P.4 mmHg  PA V2 VTI: 27.4 cm  PA acc time: 0.14 sec  AV Sai Ratio (DI): 0.76  MARGY Index (cm2/m2): 1.4  E/E' av.3  Lateral E/e': 6.2  Medial E/e': 12.4  RV S Sai: 16.3 cm/sec     ______________________________________________________________________________  Report approved by: Kg Lipscomb 2023 01:01 PM  A/p:  (I25.10) Coronary artery disease involving native coronary artery of native heart without angina pectoris  (primary encounter diagnosis)  Comment: denies any CP or SOB, risk factors are well controlled  Except still smokes   He must quit smoking , see below  Cont statin., asa , BB etc  Refill medications when due   Fasting labs before visit order placed   (I10) Benign essential hypertension  Initial blood pressure elevated repeat blood pressure improved  Avoid NSAIDs, coffee, tea etc.  Lose weight  Monitor blood pressure at home continue metoprolol  Plan:     (R73.01) IFG (impaired fasting glucose)  Comment: Recent A1c 6.0<-- 6.3 and before it was 5.9 TLC suggested , repeat A1c and CMP in 6 months  Plan:     (F17.218) Cigarette nicotine dependence with other nicotine-induced disorder  He started smoking since 7th grade , 1/2 to 1 pk a day and quit in between few years   Currently  smokes 4 cigs a day for 4 days , educated benefits of quitting , he wanted to quit on his own.   Suggested utilizing gum OTC  More than 3 minutes of smoking cessation counseling was done with the patient  Plan:     (R91.8) Pulmonary nodules  Comment: quit smoking and follow up with primary for surveillance   Reviewed previous  CT scan results radiologist suggested no further follow-up but if he continues to smoke he may need repeat CT chest through primary care office  This was discussed with the patient    (E78.2) Mixed hyperlipidemia  Comment: well controlled with atorva and TLC continue same   Repeat FLP in 6 year  Plan:     (E66.01) Morbid obesity, unspecified obesity type (H)  He lost  27 pounds weight since last visit , congratulated   Continue same plan  More than 35 minutes spent on the date of the encounter doing chart review, history and exam, documentation and further activities as noted above.  More than 3 minutes of smoking cessation counseling was done    Niesha Lew MD, SUSAN, [facility]  Vascular Medicine  Clinical lipidologist  Clinical hypertension specialist

## 2023-12-19 DIAGNOSIS — I10 BENIGN ESSENTIAL HYPERTENSION: ICD-10-CM

## 2023-12-19 DIAGNOSIS — I25.10 CORONARY ARTERY DISEASE INVOLVING NATIVE CORONARY ARTERY OF NATIVE HEART WITHOUT ANGINA PECTORIS: ICD-10-CM

## 2023-12-19 RX ORDER — METOPROLOL TARTRATE 25 MG/1
25 TABLET, FILM COATED ORAL 2 TIMES DAILY
Qty: 180 TABLET | Refills: 3 | Status: SHIPPED | OUTPATIENT
Start: 2023-12-19

## 2024-01-10 DIAGNOSIS — E78.2 MIXED HYPERLIPIDEMIA: ICD-10-CM

## 2024-01-10 DIAGNOSIS — I25.10 CORONARY ARTERY DISEASE INVOLVING NATIVE CORONARY ARTERY OF NATIVE HEART WITHOUT ANGINA PECTORIS: ICD-10-CM

## 2024-01-11 RX ORDER — ATORVASTATIN CALCIUM 80 MG/1
80 TABLET, FILM COATED ORAL DAILY
Qty: 90 TABLET | Refills: 1 | Status: SHIPPED | OUTPATIENT
Start: 2024-01-11 | End: 2024-03-13

## 2024-01-11 NOTE — TELEPHONE ENCOUNTER
atorvastatin (LIPITOR) 80 MG tablet   Last Written Prescription Date:  3/10/23  Last Fill Quantity: 90,  # refills: 3    Last visit with provider:  9/6/23  Follow up recommended:  3/2024      Requested Prescriptions   Pending Prescriptions Disp Refills    atorvastatin (LIPITOR) 80 MG tablet [Pharmacy Med Name: Atorvastatin Calcium 80 MG Oral Tablet] 90 tablet 1     Sig: TAKE 1 TABLET BY MOUTH DAILY       Statins Protocol Passed - 1/10/2024  9:09 PM        Passed - LDL on file in past 12 months     Recent Labs   Lab Test 08/28/23  0821   LDL 56             Passed - No abnormal creatine kinase in past 12 months     No lab results found.             Passed - Recent (12 mo) or future (30 days) visit within the authorizing provider's specialty     The patient must have completed an in-person or virtual visit within the past 12 months or has a future visit scheduled within the next 90 days with the authorizing provider s specialty.  Urgent care and e-visits do not quality as an office visit for this protocol.          Passed - Medication is active on med list        Passed - Patient is age 18 or older           Prescription approved per [facility] Refill Protocol.

## 2024-03-06 ENCOUNTER — LAB (OUTPATIENT)
Dept: LAB | Facility: CLINIC | Age: 59
End: 2024-03-06
Payer: COMMERCIAL

## 2024-03-06 DIAGNOSIS — E78.2 MIXED HYPERLIPIDEMIA: ICD-10-CM

## 2024-03-06 DIAGNOSIS — R73.01 IFG (IMPAIRED FASTING GLUCOSE): ICD-10-CM

## 2024-03-06 DIAGNOSIS — I10 BENIGN ESSENTIAL HYPERTENSION: ICD-10-CM

## 2024-03-06 LAB
BASOPHILS # BLD AUTO: 0.1 10E3/UL (ref 0–0.2)
BASOPHILS NFR BLD AUTO: 1 %
EOSINOPHIL # BLD AUTO: 1.1 10E3/UL (ref 0–0.7)
EOSINOPHIL NFR BLD AUTO: 9 %
ERYTHROCYTE [DISTWIDTH] IN BLOOD BY AUTOMATED COUNT: 12.7 % (ref 10–15)
HBA1C MFR BLD: 5.9 % (ref 0–5.6)
HCT VFR BLD AUTO: 50.1 % (ref 40–53)
HGB BLD-MCNC: 16.1 G/DL (ref 13.3–17.7)
IMM GRANULOCYTES # BLD: 0 10E3/UL
IMM GRANULOCYTES NFR BLD: 0 %
LYMPHOCYTES # BLD AUTO: 3.3 10E3/UL (ref 0.8–5.3)
LYMPHOCYTES NFR BLD AUTO: 28 %
MCH RBC QN AUTO: 31.6 PG (ref 26.5–33)
MCHC RBC AUTO-ENTMCNC: 32.1 G/DL (ref 31.5–36.5)
MCV RBC AUTO: 98 FL (ref 78–100)
MONOCYTES # BLD AUTO: 0.8 10E3/UL (ref 0–1.3)
MONOCYTES NFR BLD AUTO: 7 %
NEUTROPHILS # BLD AUTO: 6.8 10E3/UL (ref 1.6–8.3)
NEUTROPHILS NFR BLD AUTO: 56 %
PLATELET # BLD AUTO: 280 10E3/UL (ref 150–450)
RBC # BLD AUTO: 5.09 10E6/UL (ref 4.4–5.9)
WBC # BLD AUTO: 12.1 10E3/UL (ref 4–11)

## 2024-03-06 PROCEDURE — 80053 COMPREHEN METABOLIC PANEL: CPT

## 2024-03-06 PROCEDURE — 36415 COLL VENOUS BLD VENIPUNCTURE: CPT

## 2024-03-06 PROCEDURE — 80061 LIPID PANEL: CPT

## 2024-03-06 PROCEDURE — 83036 HEMOGLOBIN GLYCOSYLATED A1C: CPT

## 2024-03-06 PROCEDURE — 84443 ASSAY THYROID STIM HORMONE: CPT

## 2024-03-06 PROCEDURE — 85025 COMPLETE CBC W/AUTO DIFF WBC: CPT

## 2024-03-07 LAB
ALBUMIN SERPL BCG-MCNC: 4.1 G/DL (ref 3.5–5.2)
ALP SERPL-CCNC: 119 U/L (ref 40–150)
ALT SERPL W P-5'-P-CCNC: 47 U/L (ref 0–70)
ANION GAP SERPL CALCULATED.3IONS-SCNC: 9 MMOL/L (ref 7–15)
AST SERPL W P-5'-P-CCNC: 31 U/L (ref 0–45)
BILIRUB SERPL-MCNC: 0.3 MG/DL
BUN SERPL-MCNC: 16.6 MG/DL (ref 6–20)
CALCIUM SERPL-MCNC: 8.6 MG/DL (ref 8.6–10)
CHLORIDE SERPL-SCNC: 105 MMOL/L (ref 98–107)
CHOLEST SERPL-MCNC: 122 MG/DL
CREAT SERPL-MCNC: 0.97 MG/DL (ref 0.67–1.17)
DEPRECATED HCO3 PLAS-SCNC: 27 MMOL/L (ref 22–29)
EGFRCR SERPLBLD CKD-EPI 2021: >90 ML/MIN/1.73M2
FASTING STATUS PATIENT QL REPORTED: YES
GLUCOSE SERPL-MCNC: 128 MG/DL (ref 70–99)
HDLC SERPL-MCNC: 34 MG/DL
LDLC SERPL CALC-MCNC: 59 MG/DL
NONHDLC SERPL-MCNC: 88 MG/DL
POTASSIUM SERPL-SCNC: 4.9 MMOL/L (ref 3.4–5.3)
PROT SERPL-MCNC: 6.5 G/DL (ref 6.4–8.3)
SODIUM SERPL-SCNC: 141 MMOL/L (ref 135–145)
TRIGL SERPL-MCNC: 145 MG/DL
TSH SERPL DL<=0.005 MIU/L-ACNC: 0.92 UIU/ML (ref 0.3–4.2)

## 2024-03-13 ENCOUNTER — OFFICE VISIT (OUTPATIENT)
Dept: OTHER | Facility: CLINIC | Age: 59
End: 2024-03-13
Attending: INTERNAL MEDICINE
Payer: COMMERCIAL

## 2024-03-13 VITALS
DIASTOLIC BLOOD PRESSURE: 79 MMHG | SYSTOLIC BLOOD PRESSURE: 116 MMHG | WEIGHT: 205.8 LBS | BODY MASS INDEX: 31.29 KG/M2 | HEART RATE: 57 BPM

## 2024-03-13 DIAGNOSIS — R73.01 IFG (IMPAIRED FASTING GLUCOSE): ICD-10-CM

## 2024-03-13 DIAGNOSIS — E78.2 MIXED HYPERLIPIDEMIA: ICD-10-CM

## 2024-03-13 DIAGNOSIS — I10 BENIGN ESSENTIAL HYPERTENSION: ICD-10-CM

## 2024-03-13 DIAGNOSIS — E66.01 MORBID OBESITY, UNSPECIFIED OBESITY TYPE (H): ICD-10-CM

## 2024-03-13 DIAGNOSIS — I25.10 CORONARY ARTERY DISEASE INVOLVING NATIVE CORONARY ARTERY OF NATIVE HEART WITHOUT ANGINA PECTORIS: Primary | ICD-10-CM

## 2024-03-13 DIAGNOSIS — F17.218 CIGARETTE NICOTINE DEPENDENCE WITH OTHER NICOTINE-INDUCED DISORDER: ICD-10-CM

## 2024-03-13 PROCEDURE — G2211 COMPLEX E/M VISIT ADD ON: HCPCS | Performed by: INTERNAL MEDICINE

## 2024-03-13 PROCEDURE — 99213 OFFICE O/P EST LOW 20 MIN: CPT | Mod: 25 | Performed by: INTERNAL MEDICINE

## 2024-03-13 PROCEDURE — 99215 OFFICE O/P EST HI 40 MIN: CPT | Performed by: INTERNAL MEDICINE

## 2024-03-13 PROCEDURE — 99406 BEHAV CHNG SMOKING 3-10 MIN: CPT | Performed by: INTERNAL MEDICINE

## 2024-03-13 RX ORDER — ATORVASTATIN CALCIUM 80 MG/1
80 TABLET, FILM COATED ORAL DAILY
Qty: 90 TABLET | Refills: 3 | Status: SHIPPED | OUTPATIENT
Start: 2024-03-13

## 2024-03-13 RX ORDER — NITROGLYCERIN 0.4 MG/1
0.4 TABLET SUBLINGUAL EVERY 5 MIN PRN
Qty: 25 TABLET | Refills: 1 | Status: SHIPPED | OUTPATIENT
Start: 2024-03-13

## 2024-03-13 NOTE — PROGRESS NOTES
[facility]        Patient is here for a  follow up.    Pt is currently taking Aspirin and Statin.    /79 (BP Location: Right arm, Patient Position: Chair, Cuff Size: Adult Regular)   Pulse 57   Wt 205 lb 12.8 oz (93.4 kg)   BMI 31.29 kg/m      The provider has been notified that the patient has no concerns.     Questions patient would like addressed today are: N/A.    Refills are needed: N/A    Has homecare services and agency name:  [email or web address]

## 2024-03-13 NOTE — PATIENT INSTRUCTIONS
Please quit smoking    Take medications as prescribed , new RX sent     Take aspirin daily with food    Follow up in a year after labs order placed     Follow up with primary for all other issues

## 2024-03-13 NOTE — PROGRESS NOTES
SUBJECTIVE:  CC:    Follow up   Review of recent labs  Still smokes 3-5 cigs a day   Intentional weight loss, maintaining  same   No abdominal pain   A1c better  Lipids excellent range   Med refills   HPI:  Mason Powers is a 58 year old male with past medical history of hypertension, obesity, nicotine dependence, coronary artery disease status post drug-eluting stent, dyslipidemia, small multiple lung nodules here today for the follow-up visit.  He underwent CT chest few months ago  for lung nodule follow-up still small multiple nodules noted and is asymptomatic, Radiologist suggested no further follow up .  He still smokes  3-5 cigs a day/4 days a week  , in the past he quit successfully He denies any chest pain, shortness of breath or palpitations . He did not require any nitroglycerin use within the last year. 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  A1c now 5.9<-- 6.0<-6.3 <-- 5.9 ( 6.0)   Recent Lipid panel excellent range  Reviewed previous  CT and ECHO  results  HISTORIES:  PROBLEM LIST:   Patient Active Problem List   Diagnosis    Abdominal pain    Hypertension    Morbid obesity (H)    Nicotine dependence    NSTEMI (non-ST elevated myocardial infarction) (H)    CAD (coronary artery disease)    Dyslipidemia    Lung nodule, small 0.4 mm on routine CT needs repeat CT in 2016.    Epigastric discomfort     PAST MEDICAL HISTORY:  Past Medical History:   Diagnosis Date    Abdominal pain 2015    with proximal SMA inflammation vasculatis w/u negative.no discetion    CAD (coronary artery disease) 2015    RUTH ANN-OM, mod RCA and mod/sev small PDA, mild-mod diffuse  Lad, Diag small with mod/sev    Dyslipidaemia     GERD (gastroesophageal reflux disease)     HTN (hypertension)     Lung nodule     very small 0.4 mm incidental finding on CT.    Morbid obesity (H) 2015    Nicotine dependence      NSTEMI (non-ST elevated myocardial infarction) (H) 2015    Uncomplicated asthma      PAST SURGICAL HISTORY:  Past Surgical History:   Procedure Laterality Date    STENT, CORONARY, S660 15/18  2015     RUTH ANN-OM2( 50%RCA, 50-70 PDA,50% LAD, diag small >50%, RUTH ANN to OM     CURRENT MEDICATIONS:  Current Outpatient Medications   Medication Sig Dispense Refill    aspirin EC 81 MG EC tablet Take 1 tablet (81 mg) by mouth daily      atorvastatin (LIPITOR) 80 MG tablet Take 1 tablet (80 mg) by mouth daily 90 tablet 3    metoprolol tartrate (LOPRESSOR) 25 MG tablet TAKE 1 TABLET BY MOUTH TWICE  DAILY 180 tablet 3    nitroGLYcerin (NITROSTAT) 0.4 MG sublingual tablet Place 1 tablet (0.4 mg) under the tongue every 5 minutes as needed for chest pain For chest pain place 1 tablet under the tongue every 5 minutes for 3 doses. If symptoms persist 5 minutes after 1st dose call 911. 25 tablet 1     ALLERGIES:  Allergies   Allergen Reactions    Shrimp Difficulty breathing     Throat starts to swell      SOCIAL HISTORY:  Social History     Socioeconomic History    Marital status:      Spouse name: Aurea    Number of children: 2    Years of education: Not on file    Highest education level: Not on file   Occupational History    Occupation:      Comment:    Tobacco Use    Smoking status: Every Day     Packs/day: .5     Types: Cigarettes    Smokeless tobacco: Former     Quit date: 7/29/2015    Tobacco comments:     3-4 cigarettes daily, smoking for the last 30 plus years. quit but restarted 1 pk/week   Substance and Sexual Activity    Alcohol use: Not Currently     Comment: socially    Drug use: No     Comment: recently quit     Sexual activity: Yes     Partners: Female   Other Topics Concern    Parent/sibling w/ CABG, MI or angioplasty before 65F 55M? Not Asked     Service Not Asked    Blood Transfusions Not Asked    Caffeine Concern Yes     Comment: 1 pot a coffee occas days     Occupational Exposure  Not Asked    Hobby Hazards Not Asked    Sleep Concern Not Asked    Stress Concern Not Asked    Weight Concern Not Asked    Special Diet Yes     Comment: more wheat/grains, less sugar, leaner meats    Back Care Not Asked    Exercise Yes     Comment: get some walking during the week     Bike Helmet Not Asked    Seat Belt Not Asked    Self-Exams Not Asked   Social History Narrative    Not on file     Social Determinants of Health     Financial Resource Strain: Not on file   Food Insecurity: Not on file   Transportation Needs: Not on file   Physical Activity: Not on file   Stress: Not on file   Social Connections: Not on file   Interpersonal Safety: Not on file   Housing Stability: Not on file     FAMILY HISTORY:  Family History   Problem Relation Age of Onset    Diabetes Father     Diabetes Brother      REVIEW OF SYSTEMS:  CONSTITUTIONAL:no malaise, fatigue, or other general symptoms  EYES: no subjective changes in visual acuity, no photophobia  ENT/MOUTH: no complaints of rhinorrhea, nasal congestion, sore throat, hearing changes  RESP:no SOB  CV: no c/o exertional chest pressure or LIZ  GI: No abdominal pain, constipation, change in bowel movements, nausea, pyrosis, BRBPR  :no polyuria or polydipsia, no dysuria, no gross hematuria  MUSCULOSKELATAL:no arthalgias or myalgias  INTEGUMENTARY/SKIN: no pruritis, rashes, or moles with recent change in size, shape, or pigmentation  NEURO: no gross sensory or motor symptoms, no dizziness, no confusion  ENDOCRINE: no polyuria or polydipsia, no heat or cold intolerance  HEME/ALLERGY/IMMUNE: no fevers, chills, night sweats, or unwanted weight loss  PSYCHIATRIC: no depression, anxiety, or internal stimuli  EXAM:  /79 (BP Location: Right arm, Patient Position: Chair, Cuff Size: Adult Regular)   Pulse 57   Wt 205 lb 12.8 oz (93.4 kg)   BMI 31.29 kg/m    BMI: Body mass index is 31.29 kg/m .  GENERAL APPEARANCE:  Pleasant  Healthy appearing male , alert, active, no  distress cooperative.  EXAM:  EYES: clear conjunctiva, no cataracts, no obvious fundoscopic abnormalities  HENT: oropharynx, nares, and TMs are WNL  NECK: no JVD, thyromegaly or lymphadenopathy, no cervical bruits  RESP: clear to auscultation without rales, wheezes, or rhonchi  CV: RRR, no murmurs, gallops, or rubs  LYMPH: no cervical , axillary, or inguinal lymphadenopathy appreciated  GI: NABS, ND/NT, no masses or organomegally appreciated  MS: no obvoius clinicallly relevant arthropathy, no evidence vasculitis  SKIN: no nevi clinically suspicious for malignancy are noted  NEURO: CN II-XII intact, no localizing sensory or motor abnoramlities noted, DTRs symmetrical bilaterally  PSYCH: Mental status exam reveals the pt to have normal mood and affect. There is no disorder of thought form or content. There is no response to internal stimuli. There is no suicidal or homicidal ideation.  [facility]chocardi[facility]  [address] [place] [address]     Name: ABIGAIL SHRESTHA  MRN: [id number]  : 1965  Study Date: 2023 12:32 PM  Age: 58 yrs  Gender: Male  Patient Location: [facility]  Reason For Study: Coronary artery disease involving native coronary artery of  maria g  Ordering Physician: SANTI BROOKS  Referring Physician: SANTI BROOKS  Performed By: Charu Villafuerte     BSA: 2.0 m2  Height: 68 in  Weight: 201 lb  HR: 65  BP: 138/93 mmHg  ______________________________________________________________________________  Procedure  Complete Echo Adult.  ______________________________________________________________________________  Interpretation Summary     Left ventricular systolic function is normal.  The visual ejection fraction is 60-65%.  No regional wall motion abnormalities noted.  The study was technically adequate. Compared to the prior study dated ,  there have been no  changes.  ______________________________________________________________________________  Left Ventricle  The left ventricle is normal in size. There is mild concentric left  ventricular hypertrophy. Left ventricular systolic function is normal. The  visual ejection fraction is 60-65%. Left ventricular diastolic function is not  assessable. No regional wall motion abnormalities noted.     Right Ventricle  The right ventricle is normal size. The right ventricular systolic function is  normal.     Atria  Normal left atrial size. Right atrial size is normal. There is no color  Doppler evidence of an atrial shunt. Lipomatous hypertrophy of the interatrial  septum is noted.     Mitral Valve  There is trace mitral regurgitation.     Tricuspid Valve  No tricuspid regurgitation. Right ventricular systolic pressure could not be  approximated due to inadequate tricuspid regurgitation. IVC diameter <2.1 cm  collapsing >50% with sniff suggests a normal RA pressure of 3 mmHg.     Aortic Valve  The aortic valve is trileaflet. No aortic regurgitation is present. No aortic  stenosis is present.     Pulmonic Valve  The pulmonic valve is not well seen, but is grossly normal.     Vessels  The aortic root is normal size.     Pericardium  There is no pericardial effusion.     Rhythm  Sinus rhythm was noted.  ______________________________________________________________________________  MMode/2D Measurements & Calculations  IVSd: 1.3 cm     LVIDd: 5.1 cm  LVIDs: 3.2 cm  LVPWd: 1.1 cm  IVC diam: 1.4 cm  FS: 37.4 %  LV mass(C)d: 234.9 grams  LV mass(C)dI: 114.7 grams/m2  Ao root diam: 3.6 cm  LA dimension: 4.3 cm  asc Aorta Diam: 3.1 cm  LA/Ao: 1.2  LVOT diam: 2.2 cm  LVOT area: 3.6 cm2  Ao root diam Index (cm/m2): 1.8  asc Aorta Diam Index (cm/m2): 1.5  RV Base: 3.7 cm  RWT: 0.43  TAPSE: 2.2 cm     Doppler Measurements & Calculations  MV E max kassandra: 83.8 cm/sec  MV A max kassandra: 51.9 cm/sec  MV E/A: 1.6  MV max PG: 3.0 mmHg  MV mean P.3  mmHg  MV V2 VTI: 28.8 cm  MVA(VTI): 2.7 cm2  MV P1/2t max sai: 95.0 cm/sec  MV P1/2t: 84.8 msec  MVA(P1/2t): 2.6 cm2  MV dec slope: 328.0 cm/sec2  MV dec time: 0.19 sec     Ao V2 max: 137.3 cm/sec  Ao max P.0 mmHg  Ao V2 mean: 95.4 cm/sec  Ao mean P.1 mmHg  Ao V2 VTI: 28.2 cm  MARGY(I,D): 2.8 cm2  MARGY(V,D): 2.7 cm2  LV V1 max P.3 mmHg  LV V1 max: 103.8 cm/sec  LV V1 VTI: 21.6 cm  SV(LVOT): 78.5 ml  SI(LVOT): 38.3 ml/m2  PA V2 max: 105.2 cm/sec  PA max P.4 mmHg  PA mean P.4 mmHg  PA V2 VTI: 27.4 cm  PA acc time: 0.14 sec  AV Sai Ratio (DI): 0.76  MARGY Index (cm2/m2): 1.4  E/E' av.3  Lateral E/e': 6.2  Medial E/e': 12.4  RV S Sai: 16.3 cm/sec     ______________________________________________________________________________  Report approved by: Kg Lipscomb 2023 01:01 PM  A/p:  (I25.10) Coronary artery disease involving native coronary artery of native heart without angina pectoris  (primary encounter diagnosis)  Comment: denies any CP or SOB, risk factors are well controlled  Except still smokes   He must quit smoking , see below  Cont statin., asa , BB etc  Refill medications when due   Fasting labs before visit order placed   (I10) Benign essential hypertension  Well controlled with current meds   Avoid NSAIDs, coffee, tea etc.  Lose weight  Monitor blood pressure at home continue metoprolol  Plan:     (R73.01) IFG (impaired fasting glucose)  Comment: Recent A1c  5.9<-6.0<-- 6.3 and before it was 5.9 TLC suggested , repeat A1c and CMP in 6 months  Plan:     (F17.218) Cigarette nicotine dependence with other nicotine-induced disorder  He started smoking since 7th grade , 1/2 to 1 pk a day and quit in between few years   Currently  smokes 3-5  4 cigs a day , educated benefits of quitting , he wanted to quit on his own.   Suggested utilizing gum OTC  More than 3 minutes of smoking cessation counseling was done with the patient  Plan:     (R91.8) Pulmonary nodules  Comment: quit smoking  and follow up with primary for surveillance   Reviewed previous CT scan results radiologist suggested no further follow-up but if he continues to smoke he may need repeat CT chest through primary care office  This was discussed with the patient    (E78.2) Mixed hyperlipidemia  Comment: well controlled with atorva and TLC continue same   Repeat FLP in one year  Plan:     (E66.01) Morbid obesity, unspecified obesity type (H)  He lost  27 pounds weight since last visit , congratulated   Continue same plan   43  minutes spent on the date of the encounter doing chart review, history and exam, documentation and further activities as noted above.  More than 3 minutes of smoking cessation counseling was done    Niesha Lew MD, FA, [facility]  Vascular Medicine  Clinical lipidologist  Clinical hypertension specialist

## 2024-10-22 DIAGNOSIS — I25.10 CORONARY ARTERY DISEASE INVOLVING NATIVE CORONARY ARTERY OF NATIVE HEART WITHOUT ANGINA PECTORIS: ICD-10-CM

## 2024-10-22 DIAGNOSIS — I10 BENIGN ESSENTIAL HYPERTENSION: ICD-10-CM

## 2024-10-23 RX ORDER — METOPROLOL TARTRATE 25 MG/1
25 TABLET, FILM COATED ORAL 2 TIMES DAILY
Qty: 180 TABLET | Refills: 1 | Status: SHIPPED | OUTPATIENT
Start: 2024-10-23

## 2024-10-23 NOTE — TELEPHONE ENCOUNTER
Last Written Prescription Date:  12/19/23  Last Fill Quantity: 180,  # refills: 3   Last office visit: 3/13/2024 ; last virtual visit: 3/10/2023 with prescribing provider:     Future Office Visit:      Requested Prescriptions   Pending Prescriptions Disp Refills    metoprolol tartrate (LOPRESSOR) 25 MG tablet [Pharmacy Med Name: Metoprolol Tartrate 25 MG Oral Tablet] 180 tablet 3     Sig: TAKE 1 TABLET BY MOUTH TWICE  DAILY       Beta-Blockers Protocol Passed - 10/23/2024  8:45 AM        Passed - Most recent blood pressure under 140/90 in past 12 months     BP Readings from Last 3 Encounters:   03/13/24 116/79   09/06/23 134/85   08/26/22 (!) 138/93       No data recorded            Passed - Patient is age 6 or older        Passed - Medication is active on med list        Passed - Medication indicated for associated diagnosis     Medication is associated with one or more of the following diagnoses:     Hypertension (HTN)   Atrial fibrillation/flutter   Angina   ASCVD   Migraine   Heart Failure   Tremor   Anxiety   Ocular hypertension   Glaucoma   PTSD   Obstructive hypertrophic cardiomyopathy   History of myocarditis   Palpitations   POTS (postural orthostatic tachycardia syndrome)   SVT (supraventricular tachycardia)   Hyperthyroidism   Tachycardia          Passed - Recent (12 mo) or future (90 days) visit within the authorizing provider's specialty     The patient must have completed an in-person or virtual visit within the past 12 months or has a future visit scheduled within the next 90 days with the authorizing provider s specialty.  Urgent care and e-visits do not quality as an office visit for this protocol.             Prescription approved per [facility] Refill Protocol.    Julissa MELENDEZ RN    [facility]  Office: [phone]  Fax: [phone]

## 2025-03-13 ENCOUNTER — LAB (OUTPATIENT)
Dept: LAB | Facility: CLINIC | Age: 60
End: 2025-03-13
Payer: COMMERCIAL

## 2025-03-13 DIAGNOSIS — E78.2 MIXED HYPERLIPIDEMIA: ICD-10-CM

## 2025-03-13 DIAGNOSIS — R73.01 IFG (IMPAIRED FASTING GLUCOSE): ICD-10-CM

## 2025-03-13 LAB
EST. AVERAGE GLUCOSE BLD GHB EST-MCNC: 131 MG/DL
HBA1C MFR BLD: 6.2 % (ref 0–5.6)

## 2025-03-19 DIAGNOSIS — I10 BENIGN ESSENTIAL HYPERTENSION: ICD-10-CM

## 2025-03-19 DIAGNOSIS — I25.10 CORONARY ARTERY DISEASE INVOLVING NATIVE CORONARY ARTERY OF NATIVE HEART WITHOUT ANGINA PECTORIS: ICD-10-CM

## 2025-03-20 RX ORDER — METOPROLOL TARTRATE 25 MG/1
25 TABLET, FILM COATED ORAL 2 TIMES DAILY
Qty: 90 TABLET | Refills: 3 | Status: SHIPPED | OUTPATIENT
Start: 2025-03-20

## 2025-03-20 NOTE — TELEPHONE ENCOUNTER
Unable to fill per [facility] protocol.  Medication and pharmacy loaded.    Julissa MELENDEZ, RN    [facility]  Office: [phone]  Fax: [phone]